# Patient Record
Sex: MALE | Race: WHITE | Employment: FULL TIME | URBAN - METROPOLITAN AREA
[De-identification: names, ages, dates, MRNs, and addresses within clinical notes are randomized per-mention and may not be internally consistent; named-entity substitution may affect disease eponyms.]

---

## 2018-12-11 ENCOUNTER — HOSPITAL ENCOUNTER (OUTPATIENT)
Dept: BEHAVIORAL HEALTH | Facility: CLINIC | Age: 53
Discharge: HOME | End: 2018-12-11
Attending: SOCIAL WORKER | Admitting: SOCIAL WORKER
Payer: COMMERCIAL

## 2018-12-11 VITALS
SYSTOLIC BLOOD PRESSURE: 133 MMHG | WEIGHT: 172 LBS | DIASTOLIC BLOOD PRESSURE: 97 MMHG | BODY MASS INDEX: 26.07 KG/M2 | HEART RATE: 68 BPM | HEIGHT: 68 IN

## 2018-12-11 PROCEDURE — H0001 ALCOHOL AND/OR DRUG ASSESS: HCPCS

## 2018-12-11 RX ORDER — TIZANIDINE HYDROCHLORIDE 2 MG/1
2 CAPSULE, GELATIN COATED ORAL 3 TIMES DAILY
COMMUNITY

## 2018-12-11 RX ORDER — DICYCLOMINE HYDROCHLORIDE 10 MG/1
10 CAPSULE ORAL
COMMUNITY

## 2018-12-11 RX ORDER — AMITRIPTYLINE HYDROCHLORIDE 10 MG/1
10 TABLET ORAL AT BEDTIME
COMMUNITY

## 2018-12-11 ASSESSMENT — ANXIETY QUESTIONNAIRES
5. BEING SO RESTLESS THAT IT IS HARD TO SIT STILL: NOT AT ALL
6. BECOMING EASILY ANNOYED OR IRRITABLE: NOT AT ALL
4. TROUBLE RELAXING: SEVERAL DAYS
3. WORRYING TOO MUCH ABOUT DIFFERENT THINGS: SEVERAL DAYS
7. FEELING AFRAID AS IF SOMETHING AWFUL MIGHT HAPPEN: NOT AT ALL
GAD7 TOTAL SCORE: 3
1. FEELING NERVOUS, ANXIOUS, OR ON EDGE: SEVERAL DAYS
2. NOT BEING ABLE TO STOP OR CONTROL WORRYING: NOT AT ALL

## 2018-12-11 ASSESSMENT — MIFFLIN-ST. JEOR: SCORE: 1599.69

## 2018-12-11 ASSESSMENT — PAIN SCALES - GENERAL: PAINLEVEL: MILD PAIN (2)

## 2018-12-11 ASSESSMENT — PATIENT HEALTH QUESTIONNAIRE - PHQ9: SUM OF ALL RESPONSES TO PHQ QUESTIONS 1-9: 4

## 2018-12-11 NOTE — PROGRESS NOTES
"[facility]  [address] [place] [id number]      ADULT CD ASSESSMENT ADDENDUM      Patient Name: Iker Stephenson  Cell Phone: [phone]  Home:  [phone]    Email: [email or web address]  Emergency Contact: Yaneth Stephenson (wife) Tel: [phone]    ________________________________________________________________________      The patient reported being:      With which race do you identify? White    Initial Screening Questions     1. Are you currently having severe withdrawal symptoms that are putting yourself or others in danger?  No    2. Are you currently having severe medical problems that require immediate attention?  No    3. Are you currently having severe emotional or behavioral problems that are putting yourself or others at risk of harm?  No    4. Do you have sufficient reading skills that will enable you to understand written materials, including the program rules and client rights materials?  Yes    Family History and other additional information     Who raised you? (parents, grandparents, adoptive parents, step-parents, etc.)    Both Parents    Please tell me what it was like growing up in your family. (please include any history of substance abuse, mental health issues, emotional/physical/sexual abuse, forms of discipline, and support)     \"I have 2 brothers and a sister. I am the middle, second oldest. I had a good childhood, upbringing. My father worked for the [employer] for 40 years. My parents were  for 42 years. I played baseball football, baseball, and hockey. 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 \"My mother has passed. My father is still alive and doing well. I get along with my brother and sister. My younger brother knows I am here, that is how close we are.\"    Do you have any children or Stepchildren? Yes, please explain: 11 year old son    Are you being investigated by Child " "Protection Services? No    Do you have a child protection worker, probation office or ? No    How would you describe your current finances?  Doing okay    If you are having problems, (unpaid bills, bankruptcy, IRS problems) please explain:  No    If working or a student are you able to function appropriately in that setting? Yes    Describe your preferred learning style:  by hands-on practice    What are your some of your personal strengths?  \"I am personable.\"    Do you currently participate in community juan m activities, such as attending Sikh, temple, Amish or Religious services?  No    How does your spirituality impact your recovery?  \"it could help. I am a confirmed Yazdanism, and my wife is Gnosticist. I could be going to Mass once in awhile to help my brain. It couldn't hurt.\"    Do you currently self-administer your medications?  Yes    Have you ever had to lie to people important to you about how much you raines?     No   Have you ever felt the need to bet more and more money?     No   Have you ever attempted treatment for a gambling problem?     No   Have you ever touched or fondled someone else inappropriately or forced them to have sex with you against their will?     No   Are you or have you ever been a registered sex offender?     No   Is there any history of sexual abuse in your family?     No   Have you ever felt obsessed by your sexual behavior, such as having sex with many partners, masturbating often, using pornography often?     No     Have you ever received therapy or stayed in the hospital for mental health problems?     No     Have you ever hurt yourself, such as cutting, burning or hitting yourself?     No     Have you ever purged, binged or restricted yourself as a way to control your weight?     No     Are you on a special diet?     No     Do you have any concerns regarding your nutritional status?     No     Have you had any appetite changes in the last 3 months?     No "   Have you had weight loss or weight gain of more than 10 lbs in the last 3 months?   If patient gained or lost more than 10 lbs, then refer to program RN / attending Physician for assessment.     Yes, explain: lost 6 lbs in the past week.   Was the patient informed of BMI?    Above,  General nutrition education     Yes   Have you engaged in any risk-taking behavior that would put you at risk for exposure to blood-borne or sexually transmitted diseases?     No   Do you have any dental problems?     No   Have you ever lived through any trauma or stressful life events?     Yes- his mother's death at age 63.   In the past month, have you had any of the following symptoms related to the trauma listed above? (dreams, intense memories, flashbacks, physical reactions, etc.)     No   Have you ever believed people were spying on you, or that someone was plotting against you or trying to hurt you?     No   Have you ever believed someone was reading your mind or could hear your thoughts or that you could actually read someone's mind or hear what another person was thinking?     No   Have you ever believed that someone of some force outside of yourself was putting thoughts into your mind or made you act in a way that was not your usual self?  Have you ever though you were possessed?     No   Have you ever believed you were being sent special messages through the TV, radio or newspaper?     No   Have you ever heard things other people couldn't hear, such as voices or other noises?     No   Have you ever had visions when you were awake?  Or have you ever seen things other people couldn't see?     No   Do you have a valid 's license?       Yes     PHQ-9, SERJIO-7 and Suicide Risk Assessment   PHQ-9 on 12/11/2018 SERJIO-7 on 12/11/2018   The patient's PHQ-9 score was 4 out of 27, indicating minimal depression.     The patient's SERJIO-7 score was 3 out of 21, indicating minimal anxiety.         [place]-Suicide Severity Rating Scale    Suicide Ideation   1.) Have you ever wished you were dead or that you could go to sleep and not wake up?     Lifetime:  No Past Month:  No   2.) Have you actually had any thoughts of killing yourself?   Lifetime:  No Past Month:  No   3.) Have you been thinking about how you might do this?     Lifetime:  NA Past Month:  NA   4.) Have you had these thoughts and had some intention of acting on them?     Lifetime:  NA Past Month:  NA   5.) Have you started to work out the details of how to kill yourself?   Lifetime:  NA Past Month:  NA   6.) Do you intend to carry out this plan?      Lifetime:  NA Past Month:  NA   Intensity of Ideation   Intensity of ideation (1 being least severe, 5 being most severe):     Lifetime:  NA Past Month:  NA   How often do you have these thoughts?  N/A      When you have the thoughts how long do they last?  N/A   Can you stop thinking about killing yourself or wanting to die if you want to?  No, unable to control thoughts   Are there things - anyone or anything (i.e. family, Temple, pain of death) that stopped you from wanting to die or acting on thoughts of suicide?  Does not apply   What sort of reasons did you have for thinking about wanting to die or killing yourself (ie end pain, stop how you were feeling, get attention or reaction, revenge)?  Does not apply   Suicidal Behavior   (Suicide Attempt) - Have you made a suicide attempt?     Lifetime:  No Past Month:  No   Have you engaged in self-harm (non-suicidal self-injury)?  NA   (Interrupted Attempt) - Has there been a time when you started to do something to end your life but someone or something stopped you before you actually did anything?  NA   (Aborted or Self-Interrupted Attempt) - Has there been a time when you started to do something to try to end your life but you stopped yourself before you actually did anything?  NA   (Preparatory Acts of Behavior) - Have you taken any steps towards making suicide attempt or preparing  "to kill yourself (such as collecting pills, getting a gun, giving valuables away or writing a suicide note)?  NA   Actual Lethality/Medical Damage:  NA     2008  [employer] [facility]  Used with permission by Oneida Madrid, PhD.       Guide to C-SSRS Risk Ratings   NO IDEATION:  with no active thoughts IDEATION: with a wish to die. IDEATION: with active thoughts. Risk Ratings   If Yes No No 0 - Very Low Risk   If NA Yes No 1 - Low Risk   If NA Yes Yes 2 - Low/moderate risk   IDEATION: associated thoughts of methods without intent or plan INTENT: Intent to follow through on suicide PLAN: Plan to follow through on suicide Risk Ratings cont...   If Yes No No 3 - Moderate Risk   If Yes Yes No 4 - High Risk   If Yes Yes Yes 5 - High Risk   The patient's ADDITIONAL RISK FACTORS and lack of PROTECTIVE FACTORS may increase their overall suicide risk ratings.     Additional Risk Factors:    Significant history of physical illness or chronic medical problems     Significant history of untreated or poorly treated chronic pain issues     History of impulsive or aggressive behaviors   Protective Factors:    Having people in his/her life that would prevent the patient from considering a suicide attempt (i.e. young children, spouse, parents, etc.)     An absence of mental health issues or stable and well treated mental health issues     A positive relationship with his/her clinical medical and/or mental health providers     Having easy access to supportive family members     Having a good community support network     Having cultural, Druze or spiritual beliefs that discourage suicide     Having restricted access to highly lethal means of suicide     Risk Status   Past month:0. - Very Low Risk:  Evaluation Counselors:  Document in Epic / SBAR to counselor \"Very Low Risk\".      Treatment Counselors:  Reassess upon admission as applicable, assess weekly in progress notes under Dimension 3 and summarize " "in Discharge / Treatment summary under Dimension 3.    Past 24 hours:0. - Very Low Risk:  Evaluation Counselors:  Document in Epic / SBAR to counselor \"Very Low Risk\".      Treatment Counselors:  Reassess upon admission as applicable, assess weekly in progress notes under Dimension 3 and summarize in Discharge / Treatment summary under Dimension 3.   Additional information to support suicide risk rating: There was no additional information to provide at this time.  Please see the above suicide risk rating information.     Mental Health Status   Physical Appearance/Attire: Appears stated age   Hygiene: well groomed   Eye Contact: at examiner   Speech Rate:  regular   Speech Volume: regular   Speech Quality: fluid   Cognitive/Perceptual:  reality based   Cognition: memory intact    Judgment: intact   Insight: intact   Orientation:  time, place, person and situation   Thought::   logical    Hallucinations:  none   General Behavioral Tone: cooperative   Psychomotor Activity: no problem noted   Gait:  no problem   Mood: normal and appropriate   Affect: congruence/appropriate   Counselor Notes: NA       Criteria for Diagnosis: DSM-5 Criteria for Substance Use Disorders      Alcohol Use Disorder Severe - 303.90 (F10.20)  Opioid Use Disorder Mild - 305.50 (F11.10)      Level of Care   I.) Intoxication and Withdrawal: 0   II.) Biomedical:  1   III.) Emotional and Behavioral:  1   IV.) Readiness to Change:  1   V.) Relapse Potential: 2   VI.) Recovery Environmental: 2       Initial Problem List     The patient lacks relapse prevention skills  The patient has poor coping skills  The patient has poor refusal skills   The patient lacks a sober peer support network  The patient lacks the ability to effectively manage his/her mental health issues    Patient/Client is willing to follow treatment recommendations.  Yes    Coun[facility]    Vulnerable Adult Checklist for OUTPATIENTS     1.  Do you have a " physical, emotional or mental infirmity or dysfunction?       Yes (explain) - 4 fusions in his neck in 2016, chronic pain as a result    2.  Does this issue impair your ability to provide for your own care without help, including providing yourself with food, shelter, clothing, healthcare or supervision?       No    3.  Because of this issue, I need assistance to protect myself from maltreatment by others.      No    Based on the above information:    This person is not a functional Vulnerable Adult according to [place] Statute 626.5572 subdivision 21.

## 2018-12-11 NOTE — PROGRESS NOTES
"Visit Date:   12/11/2018      CHEMICAL DEPENDENCY ASSESSMENT       EVALUATION COUNSELOR:  Pilar Moreno MA, [facility]      PATIENT ADDRESS: [address] [place] [address]   TELEPHONE: [phone].   STATISTICS:  Date of Birth 1965, age 53, gender male.  Marital Status:  .   DATE OF EVALUATION:  12/11/2018   INSURANCE:  [employer] [facility]      REASON FOR EVALUATION:  Mr. Iker Stephenson presents to [facility] for outpatient evaluation of possible chemical dependency.  The reason for the CD evaluation was due to patient, \"I am not making the right decisions every day.  I am not.  I am making poor decisions.  The drinking and the pain medications I was on for a long, long time.  It is easy to get them from friends.\"  The patient is at risk of losing his job if he does not address his addiction.  He is willing to attend an [facility] CD treatment program.      HEALTH HISTORY AND MEDICATIONS:  He had for four neck fusions in late 2016.  He has chronic neck and back pain, high blood pressure and diverticula in his colon.      MEDICATIONS:  Amitriptyline, Bentyl, lisinopril, tizanidine.      HISTORY OF PREVIOUS TREATMENT AND COUNSELING:  The patient reports 1 CD treatment 20-25 years ago.      HISTORY OF ALCOHOL AND DRUG USE:    Alcohol: Age of first use 18, heaviest use was between the ages of [age] when he was drinking 7-8 beers every couple of days.  Prior to 2017, he was drinking once every couple of weeks, having about 5 beers.  He reports he goes 7-10 days without drinking.  [id number], in the past year and a half, he is mostly drinking once a week on Saturday having 7 Budweiser beers.  He will drink 6-7 beers on a Sunday once a month.  Collateral reports excessive drinking on the weekends and occasional drinking during the work week.  Last use about 10 days ago, about 5 beers.    Cocaine: Age of first use 25. He will use every couple of months, 1 line each time.  He " "denies heavy use.  His UA was positive for cocaine.  The patient denies using more recently than 12/05/2018.  Last use of cocaine was 12/5/2018, 1 line.    Meth:  He reports he tried meth for the first time 10 days ago.    Other opiate and synthetics:  Age of first use 2009.  He was first prescribed 9 years ago and was prescribed oxycodone and Percocet for 6 months after his neck surgery.  At the end of 2016, he had 4 fusions in his neck and there was 120 days of recovery.  He was prescribed \"Percocet and Oxy for 6 months, which carried into 2018.\"  He denies any abuse of the pain medication.  Two months ago, he was hospitalized for diverticulitis and upon discharge was prescribed 20 tabs of 10 mg of Percocet.  This prescription lasted 3 days.  For the past 6 months, patient reports buying 10 tabs of oxycodone off the street every couple of weeks.  He reports the tabs will last him a week and because he will take 1 tab a day usually in late afternoon/evening.  Last use was 12/05/2018, 5 mg of oxycodone.      SUMMARY OF CHEMICAL DEPENDENCY SYMPTOMS ACKNOWLEDGED BY THE PATIENT:  The patient identifies with 8/11 DSM-V criteria for a diagnostic impression of substance use disorder.      SUMMARY OF COLLATERAL DATA:  The patient's wife, Yaneth Stephenson, stated patient does not drink every day, but when he does, he drinks an excessive amount of alcohol.  This typically occurs over the weekend and occasionally patient will drink during the week.  She reports he can go without drinking for a period of time before he binges.  He will sometimes drink beer.  She stated she does not know much about his oxycodone use and she mostly sees the changes in him due to his drinking.  She reports the patient's work is stressful and his health issues are concerning.  As a result, the patient gets depressed and he will drink.  She has been concerned about his drinking for the past couple of years.  He reported the patient has been kind of " depressed lately.  In 2004, his mother passed away and they were very close.  She is not sure the patient has ever gotten over it.  She reports the patient does not see his dad a lot and patient becomes depressed because of that.  She stated the patient who is a good dad and loves her son.  He is going to drop what he is doing to help another person.      VULNERABLE ADULT ASSESSMENT:  This person is not a functional vulnerable adult court according to [place] Statute 636.557, subdivision 21.      IMPRESSION:     1.  Alcohol use disorder, severe (F10.20).   2.  Opiate use disorder, mild (F11.1).      [facility] PLACEMENT CRITERIA:   DIMENSION 1:  Acute Intoxication or Withdrawal Potential:  Risk level 0.  The patient reports his last use of oxycodone and cocaine was on 12/05/2018 and he last used meth and alcohol about 10 days ago.  The patient displays no intoxication or withdrawal symptomatology at this time.  The patient denies having any feelings of withdrawal.  The patient was given a Breathalyzer during his evaluation is blood alcohol content was 0.  He was also given a UA during the evaluation.  UA was positive for cocaine and substances tested.  The patient denied using cocaine more recently than 12/05/2018.       DIMENSION 2:  Biomedical Conditions and Complications:  Risk level 1.  The patient denies having any chronic biomedical conditions that interfere with treatment or any other recovery skills training or workshop.  The patient reports having 4 neck fusions in late 2016.  He has chronic neck and back pain, high blood pressure, and diverticulitis in his colon.  He reports taking lisinopril daily.  At the time of CD evaluation, the patient's blood pressure was 133/97, pulse 68 beats per minute.  The patient's BMI score was 26.15, placing him in the overweight category.  He reports having pain at this time and reports his pain level to be a 2 on 0-10 pain rating scale.  The patient denies any consumption of  "nicotine products at this time.      DIMENSION 3:  Emotional/Behavioral Conditions and Complications:  Risk level 1.  The patient denies having any mental health diagnosis and denies taking any medications for his mental health.  The patient denies any history of abuse.  At the time of the assessment, the patient's PHQ-9 score was 4, minimal depression.  SERJIO-7 score was 3, minimal anxiety.  The patient appears to have some emotional stress management skills.  The patient denies any self-injury, previous suicide attempts or suicidal thoughts at this time.  During the patient's assessment, his [place] Suicide Severity rating Scale Score is 0, very low risk.        DIMENSION 4:  Readiness for Change:  Risk level 1.  Patient admits he has had a problem with alcohol since about 2000.  He has recognized that he has had a problem with oxycodone since he began buying it off the street about 6 months ago.  The patient displays verbal compliance and motivation to stop using alcohol and attend [facility] CD treatment.  The patient can only return to work once he has been enrolled in a CD treatment program.  The patient appears to be in the contemplation stage within a stage of change model.      DIMENSION 5:  Relapse and Continued Use Potential:  Risk level 2.  The patient reports he attended an [facility] CD treatment program 20-25 years ago for his drinking.  He denies ever attending a detox program or a 12-step meeting before.  Patient lacks education into his disease of addiction.  The patient identifies relapse triggers for alcohol as \"I just like the way I feel.  The drinking is a trigger for everything else.\"  His triggers for oxycodone is \"pain\" and for cocaine \"alcohol.  If I don't drink, I don't think about.\"  The patient lacks impulse control, long-term sober maintenance skills.  He has a moderately high risk for continued use.      DIMENSION 6:  Recovery Environment:  Risk level 2.  The patient currently lives with his wife and " son.  He denied having any concerns regarding his immediate living environment or neighborhood.  The patient reported having relationship problems with his wife and his employer due to his alcohol abuse issues.  The patient denied having history of legal issues.  The patient is employed and he last worked 12/5/2018 due to being in [place] because of his addiction.  The patient denied having any increased financial stress.  He lacks a current sober peer support network.      RECOMMENDATIONS:   1.  Complete intensive outpatient CD treatment program such as [facility] with Ramandeep Chandler.   2.  Abstain from all mood-altering chemicals unless prescribed by licensed provider.   3.  Attend at minimum 1 weekly support group meeting such as 12-step based, SMART Recovery, Celebrate Recovery or Refuge Recovery meetings.   4.  Actively work with a male sponsor.   6.  Follow recommendations of treatment and medical providers.      INITIAL PROBLEM LIST:   1.  The patient lacks relapse prevention skills.   2.  The patient has poor coping skills.   3.  The patient has poor refusal skills.   4.  The patient lacks sober peer support network.   5.  The patient lacks ability to effectively manage his mental health.         This information has been disclosed to you from records protected by Federal confidentiality rules (42 CFR part 2). The Federal rules prohibit you from making any further disclosure of this information unless further disclosure is expressly permitted by the written consent of the person to whom it pertains or as otherwise permitted by 42 CFR part 2. A general authorization for the release of medical or other information is NOT sufficient for this purpose. The Federal rules restrict any use of the information to criminally investigate or prosecute any alcohol or drug abuse patient.      GREYSON JEFF CD             D: 12/11/2018   T: 12/11/2018   MT: MAKR      Name:     FRANCISCO JAVIERAIMECALLUM   MRN:       [id number]        Account:      [id number]   :      1965           Visit Date:   2018      Document: [id number]

## 2018-12-11 NOTE — PROGRESS NOTES
[facility]  [address] [place] [address]., [place] [address]      12/11/2018      Iker Stephenson  [address] [place] [address]      Dear Mr. Stephenson,    It was a pleasure meeting with you on 12/11/2018 for your Chemical Dependency Evaluation. Based on your evaluation, the recommendation is:  1)  Complete an Intensive Outpatient CD Treatment Program, such as [facility] with Ramandeep Chandler.  2)  Abstain from all mood-altering chemicals unless prescribed by a licensed provider.   3)  Attend one weekly support group meeting, such as 12 step based (AA/NA), SMART Recovery, Health Realizations, Celebrate Recovery meetings, and/or Refuge Recovery meetings.     4)  Actively work with a male mentor/sponsor on a weekly basis.   5)  Follow all the recommendations of your treatment/medical providers.    Please contact Ramandeep Chandler at [phone] to set up a start time. Group is held every Monday, Wednesday, and Thursday from 5:30-7:30pm and on Fridays 5:30-8:30pm.    If I can be of further service, please don't hesitate to call.    Sincerely,        Pilar Moreno MA [facility]  CD Evaluation Counselor  [phone]    (emailed to pt on 12/11/2018)

## 2018-12-11 NOTE — PROGRESS NOTES
[facility]  [address] [place] [id number]      12/11/2018      RE: Iker Stephenson    Dear MrHannah Meagan Thompson,    I met with Iker Seema on 12/11/2018 for his Chemical Dependency Evaluation. Based on the evaluation, the recommendation is:  1)  Complete an Intensive Outpatient CD Treatment Program, such as [place] at the [facility] with Ramandeep Chandler.  2)  Abstain from all mood-altering chemicals unless prescribed by a licensed provider.   3)  Attend one weekly support group meeting, such as 12 step based (AA/NA), SMART Recovery, Health Realizations, Celebrate Recovery meetings, and/or Refuge Recovery meetings.     4)  Actively work with a male mentor/sponsor on a weekly basis.   5)  Follow all the recommendations of your treatment/medical providers.    Iker was asked to contact Ramandeep Chandler at [phone] to set up an intake start date. Group is held every Monday, Wednesday, and Thursday from 5:30-7:30pm and on Fridays 5:30-8:30pm.    If I can be of further service, please don't hesitate to call.    Sincerely,        Pilar Moreno MA [facility]  CD Evaluation Counselor  [phone]    (emailed to Meagan Thompson on 12/11/2018)

## 2018-12-11 NOTE — PROGRESS NOTES
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 more than you planned? (DSM)     He reports he is not drinking more beers than he thought he would drink.    3. How important is using to your social connections? Do many of your family or friends use?     Friends: they drink  Wife: sometimes, she's not a big drinker.    4A. Are you currently in a significant relationship?     Yes.  4B. How long? Almost 20 years     4C. Sexual Orientation:     Heterosexual    5A. Who do you live with?      His wife and son.    5B. Tell me about their alcohol/drug use and mental health issues.     NA    5C. Are you concerned for your safety there? No    5D. Are you concerned about the safety of anyone else who lives with you? No    6A. Do you have children who live with you?     Yes- 11 year old son.    6B. Do you have children who do not live with you?     No    7A. Who supports you in making changes in your alcohol or drug use? What are they willing to do to support you? Who is upset or angry about you making changes in your alcohol or drug use? How big a problem is this for you?      His wife, \"my brother. I talked with him about it yesterday.\"    7B. This table is provided to record information about the person s relationships and available support It is not necessary to " ask each item; only to get a comprehensive picture of their support system.  How often can you count on the following people when you need someone?   Partner / Spouse Always supportive   Parent(s)/Aunt(s)/Uncle(s)/Grandparents Always supportive   Sibling(s)/Cousin(s) Always supportive   Child(tabitha) Always supportive   Other relative(s) Always supportive   Friend(s)/neighbor(s) Always supportive   Child(tabitha) s father(s)/mother(s) N/A   Support group member(s) N/A   Community of juan m members N/A   /counselor/therapist/healer Always supportive   Other (specify) N/A     8A. What is your current living situation?     He owns his own home.    8B. What is your long term plan for where you will be living?     No changes.    8C. Tell me about your living environment/neighborhood? Ask enough follow up questions to determine safety, criminal activity, availability of alcohol and drugs, supportive or antagonistic to the person making changes.      No concerns.    9. Criminal justice history: Gather current/recent history and any significant history related to substance use--Arrests? Convictions? Circumstances? Alcohol or drug involvement? Sentences? Still on probation or parole? Expectations of the court? Current court order? Any sex offenses - lifetime? What level? (DSM)    None    10. What obstacles exist to participating in treatment? (Time off work, childcare, funding, transportation, pending long-term time, living situation)     His work schedule.    Dimension VI Ratings   Recovery environment - The placing authority must use the criteria in Dimension VI to determine a client s recovery environment.   RISK DESCRIPTIONS - Severity ratin Client is engaged in structured, meaningful activity, but peers, family, significant other, and living environment are unsupportive, or there is criminal justice involvement by the client or among the client s peers, significant others, or in the client s living  environment.    REASONS SEVERITY WAS ASSIGNED - (What support does the person have for making changes? What structure/stability does the person have in his or her daily life that will increase the likelihood that changes can be sustained? What problems exist in the person s environment that will jeopardize getting/staying clean and sober?)     The patient currently lives with his wife and son. The patient denied having any concerns regarding his immediate living environment or neighborhood. The patient reported having a relationship conflict with his wife and his employer due to his alcohol abuse issues. The patient denied having a history of legal issues. The patient is employed and he last worked 12/5/2018 due to being on a KAMERON because of his addiction.  The patient denied having any increased financial stress.  The patient lacks a current sober peer support network.         Client Choice/Exceptions   Would you like services specific to language, age, gender, culture, Taoism preference, race, ethnicity, sexual orientation or disability?  No    What particular treatment choices and options would you like to have? IOP    Do you have a preference for a particular treatment program? [facility]    Criteria for Diagnosis     Criteria for Diagnosis  DSM-5 Criteria for Substance Use Disorder  Instructions: Determine whether the client currently meets the criteria for Substance Use Disorder using the diagnostic criteria in the DSM-V pp.[id number]. Current means during the most recent 12 months outside a facility that controls access to substances    Category of Substance Severity (ICD-10 Code / DSM 5 Code)     Alcohol Use Disorder Severe  (10.20) (303.90)   Cannabis Use Disorder NA   Hallucinogen Use Disorder NA   Inhalant Use Disorder NA   Opioid Use Disorder Mild   (F11.10) (305.50)   Sedative, Hypnotic, or Anxiolytic Use Disorder NA   Stimulant Related Disorder NA   Tobacco Use Disorder NA   Other (or unknown) Substance  Use Disorder NA       Collateral Contact Summary   Number of contacts made: 1    Contact with referring person:  NA.    If court related records were reviewed, summarize here: NA    Information from collateral contacts supported/largely agreed with information from the client and associated risk ratings.      Rule 25 Assessment Summary and Plan   's Recommendation    1)  Complete an Intensive Outpatient CD Treatment Program, such as [facility] at the [place] location with Ramandeep Chandler.  2)  Abstain from all mood-altering chemicals unless prescribed by a licensed provider.   3)  Attend one weekly support group meeting, such as 12 step based (AA/NA), SMART Recovery, Health Realizations, Celebrate Recovery meetings, and/or Refuge Recovery meetings.     4)  Actively work with a male mentor/sponsor on a weekly basis.   5)  Follow all the recommendations of your treatment/medical providers.      Collateral Contacts     Name:    Yaneth Stephenson   Relationship:    wife   Phone Number:    [phone] Releases:    Yes     Yaneth stated pt doesn't drink every day, but when he does, he drinks an excessive amount of alcohol. This typically occurs over the weekend, occassionally pt will drink during the week. She reports he can go without drinking for a period of time before he binges. He will sometimes drink beer. She stated she didn't know much about his oxycodone use since she mostly sees the changes in him due to his drinking. She reports pt's work is stressful and his health issues are concerning and as a result, pt gets depressed and he will drink. She has been concerned about his drinking for the past couple of years. She reported pt has been kind of depressed lately. In 2004 his mother passed away and they were very close. She isn't sure pt has ever gotten over it. She reports pt doesn't see his dad a lot and pt becomes depressed because of it. She stated pt is a good dad and loves their son. He is willing to  drop what he's doing to help another person.  ollateral Contacts      A problematic pattern of alcohol/drug use leading to clinically significant impairment or distress, as manifested by at least two of the following, occurring within a 12-month period: 8/11    There is a persistent desire or unsuccessful efforts to cut down or control alcohol/drug use  A great deal of time is spent in activities necessary to obtain alcohol, use alcohol, or recover from its effects.  Recurrent alcohol/drug use resulting in a failure to fulfill major role obligations at work, school or home.  Continued alcohol use despite having persistent or recurrent social or interpersonal problems caused or exacerbated by the effects of alcohol/drug.  Important social, occupational, or recreational activities are given up or reduced because of alcohol/drug use.  Recurrent alcohol/drug use in situations in which it is physically hazardous.  Alcohol/drug use is continued despite knowledge of having a persistent or recurrent physical or psychological problem that is likely to have been caused or exacerbated by alcohol.  Withdrawal, as manifested by either of the following: The characteristic withdrawal syndrome for alcohol/drug (refer to Criteria A and B of the criteria set for alcohol/drug withdrawal).      Specify if: In early remission:  After full criteria for alcohol/drug use disorder were previously met, none of the criteria for alcohol/drug use disorder have been met for at least 3 months but for less than 12 months (with the exception that Criterion A4,  Craving or a strong desire or urge to use alcohol/drug  may be met).     In sustained remission:   After full criteria for alcohol use disorder were previously met, non of the criteria for alcohol/drug use disorder have been met at any time during a period of 12 months or longer (with the exception that Criterion A4,  Craving or strong desire or urge to use alcohol/drug  may be met).    Specify if:   This additional specifier is used if the individual is in an environment where access to alcohol is restricted.    Mild: Presence of 2-3 symptoms    Moderate: Presence of 4-5 symptoms    Severe: Presence of 6 or more symptoms

## 2018-12-12 ASSESSMENT — ANXIETY QUESTIONNAIRES: GAD7 TOTAL SCORE: 3

## 2018-12-18 ENCOUNTER — HOSPITAL ENCOUNTER (OUTPATIENT)
Dept: BEHAVIORAL HEALTH | Facility: CLINIC | Age: 53
End: 2018-12-18
Attending: SOCIAL WORKER
Payer: COMMERCIAL

## 2018-12-18 PROCEDURE — H2035 A/D TX PROGRAM, PER HOUR: HCPCS

## 2018-12-18 NOTE — PROGRESS NOTES
Patient:  Iker Stephenson    Date: December 18, 2018    Comprehensive Assessment UPDATE        Comprehensive Summary Update and Review  Counselor met with patient on 12/18/2018 and reviewed the Comprehensive Assessment.    The following updates have been made: Last use date changed from 12/1/2018 to 11/26/18.

## 2018-12-18 NOTE — PROGRESS NOTES
Initial Services Plan        Service Initiation Date: 12/18/2018    Immediate health and/or safety concerns: No    Identify health and safety concern(s) below and include plan to address:    None Identified    Client issues to be addressed in the first treatment sessions:     Other: I fear I may fear.      Treatment suggestions for client during the time between intake (admit date) and completion of the individual treatment plan:     Look for a sober support network, i.e. 12 step, Smart Recovery, Celebrate Recovery, etc  Introduce yourself to your treatment group. Spend time getting to know your peers  Review your patient or client handbook    Completed by: MANGO Enriquez  Date completed: 12/18/2018 at 3:12 PM

## 2018-12-19 ENCOUNTER — HOSPITAL ENCOUNTER (OUTPATIENT)
Dept: BEHAVIORAL HEALTH | Facility: CLINIC | Age: 53
End: 2018-12-19
Attending: SOCIAL WORKER
Payer: COMMERCIAL

## 2018-12-19 PROCEDURE — H2035 A/D TX PROGRAM, PER HOUR: HCPCS | Mod: HQ

## 2018-12-19 NOTE — PROGRESS NOTES
"  Key to reading dimensions:   -Bolded text in a dimension indicates information about a client at service initiation.  -Underlined portions in a dimension indicate important information that writer is carrying forward from week to week as a reminder.    CD ADULT Progress Note     Treatment Plan Review completed on:  12/21/2018     Attendance Dates: Phase 1 group sessions on 12/19, 12/20, 12/21//2018     Total # of Group Sessions for Phase I: 4.  Total # of Group Sessions in Phase II:  N/A  Total # of Group Sessions in Phase III:  N/A  Total # of 1:1 Sessions: 2.       MONDAY TUESDAY WEDNESDAY THURSDAY FRIDAY SATURDAY SUNDAY TOTAL HOURS   Group Therapy   2 2 3   7   Specialty Groups*            1:1  1.0  1    2.0   Family Program           [place]             Phase III             Absent (place \"X\")             Total's  1 2.0 3 3   9.0     *Specialty Groups include Mental Health Care, Assertiveness and Communication, Sobriety Maintenance Skills, Spiritual Care, Stress Management, Relapse Prevention, Family Systems.                             Learning Style:    Hands on    Staff member contributing: Ramandeep Chandler MA, [facility]     Received supervision: No.    Client contributed to goals and plan: Yes (explain) - New Plan.    Client received a signed copy of treatment plan/revised plan: Yes    Changes to Treatment Plan: No.    Client agrees with plan/revised plan: Yes    Any changes in Vulnerable Adult Status:  No    1) Care Coordination Activities: No.  2) Medical, Mental Health and other appointments the client attended: SEE DIM's II & III below.  3) Medication issues: No.  4) Physical and mental health problems: SEE DIM's II & III below.  5) Review and evaluation of the individual abuse prevention plan: N/A     Substance Use Disorders:    303.90 (F10.20) Alcohol Use Disorder Severe  305.20 (F12.10) Cannabis Use Disorder Mild  305.60 (F14.10) Cocaine Use Disorder Mild    ASAM Risk Ratings and Data     DIMENSION 1: Acute " "Intoxication/Withdrawal  The client's ability to cope with withdrawal symptoms and current state of intoxication     Acute Intoxication/Withdrawal - Current Risk Factor:  1    Reporting a sober date of: 10/16/2018 for Alcohol.    Goals:  Develop effective strategies to maintain sobriety. Report any substance or alcohol use to both counselor and the group.     Data: Client denied nor demonstrated any signs/symptomology of intoxication and/or withdrawal. Client denied having any symptoms of PAW. Client confirmed his last use date was on 12/5/2018 for cocaine and oxy.       DIMENSION 2:  Biomedical Conditions and Complaints  The degree to which any physical disorder would interfere with treatment for substance abuse and the client's ability to tolerate any related discomfort     Biomedical Conditions and Complaints - Current Risk Factor:  1    Goals: Disclose CD status to medical providers and follow up with medical interventions while in DOP.     Goal: Client will effectively manage his physical health and will follow recommendations of your medical provider.    Data: Client sees Dr. Salomon Green and NP Tammy Cheatham, [facility] diverticulitits, high blood pressure, chronic neck and back pain.      Client rated his overall health on a scale of 1-10 (1=poor, 10=excellent): 7. Client reports doing the following for self-care during the past week; \"completing work projects.\" Client reports the following changes to his physical health over the past week; \"I feel strong\". Client reports the following upcoming doctor s appointments: Colonoscopy, January 10, 2018\".     DIMENSION 3:  Emotional/Behavioral/Cognitive Conditions and Complications  The degree to which any condition or complications are likely to interfere with treatment for substance abuse or with function in significant life areas and the likelihood of risk of harm to self or others.     Emotional/Behavioral - Current Risk Factor:  1    DSM-5 " "Diagnoses:   Client denies mental health diagnosis  Suicide Assessment:   Risk Status    Ideation - Active thoughts of suicide Intent to follow through on suicide Plan for completing suicide    Yes No Yes No Yes No   Emergent  x  x  x   Urgent / Non-Emergent  x  x  x   Non- Urgent  x  x  x   No Current/Active Risk  x  x  x      Goals: To develop affective tools to manage his emotions.  Goal:  Learn and utilize coping skills to manage his irrational believes and struggles with impulsivity in a healthy way.  Goals:  Increase awareness and understanding of the relationship between mental health and substance use.     Data: Client reports feeling the following two emotions today; \"Anxious and happy.\" Client rated the following MH symptoms on a scale of 1-10 (0=did not endorse): sleeplessness- 1, fatigue- 2, difficulty concentrating- 2, mood swings- 2, irritability- 4, sadness- 3, excessive sleep- 1, racing thoughts- 1, hopelessness- 1. Client denies experiencing any other aforementioned symptomology over the past week. Client reports taking the following psychotropic medications; Lisinopril. Client identified his 2 stressors of Work and Fatherhood.  He is addressing his 2 stressors by \"still learning how to cope.\"  Client denies having a mental health therapist. Client denied suicidal ideation or self-injurious behavior.    DIMENSION 4:  Readiness to Change  Consider the amount of support and encouragement necessary to keep the client involved in treatment.     Readiness to Change - Current Risk Factor:  1    Goals:  Increase awareness with how substance use is conflicted with personal values and address any ambivalence to change.   Goals:  Address ambivalence regarding substance use and sobriety.  Goals:  Increase insight into how use has affected you and those around you  Goals:  Increase and/or maintain internal motivation to achieve sobriety from substances.    Data: Client rated their motivation of the week on a " "scale of 1-10 (1=low, 10= high): 2. Client reports his main motivation for sobriety is \"family\". Client reports \"Things with no structure- free time\" is what blocks motivation for sobriety for him. Client reports using the following coping strategies that are helpful for his continued sobriety: \"Talking to my wife and son.\" Client reports wanting to learn the following coping skills and/or to practice: \"Reading.\"      DIMENSION 5:  Relapse/Continued Use/Continued Problem Potential  Consider the degree to which the client recognizes relapse issues and has the skills to prevent relapse of either substance use or mental health problems.     Relapse/Continued Use/Continued Problem Potential - Current Risk Factor:  2    Goals:  Increase awareness of the disease concept of addiction and insight into personal relapse triggers and strategies to address.   Goals:  Identify and address relapse triggers and cues.    Data: Client rated his \"Average Craving Rating\" on a scale of 1-10 (1=low, 10= high): 2.  Client reported \"work stress\" is his trigger and the way he is addressing his trigger or minimize his cravings this past week; \"stay busy with home projects.\" Client reported that he \"completed HVAC repairs for two friends\" to help him avoid using this past week.  Client reports that \"Anger\" was his biggest trigger for the week and he addressed it by \"hugged my son to change my thoughts.\"     DIMENSION 6:  Recovery Environment  Consider the degree to which key areas of the client's life are supportive of or antagonistic to treatment participation and recovery.     Recovery Environment - Current Risk Factor: 2    Support group meetings attended this week: 0  Where: N/A  If zero attendance, what s preventing you: Just started program.    Do you currently have a sponsor: No  Did you have contact with your sponsor this week? No    Did family agree to attend family week:  NA    If yes: NA    Goals:  Increase sober support network. " "Continue to identify and attend sober support group meetings.   upport network and be involved in sober activities.  Implement and/or improve positive daily structure and routine.  Build sober social support in community.  Implement and/or improve positive daily structure and routine.  Learn and practice communication and assertiveness skills.  Address relationship conflicts with family.    Data: Client rated their \"Living Situation\" on a scale of 1-10 (1=very helpful, 10=very stressful): 3. Client reports that he is living with his wife and son and reports that his wife is supportive and his son has mild signs of Autism. Client reports during the past week that he \"told my family and friends of my situation\" as a way to expand his sober support network. Client reports he enjoyed, \"doing hands on porjects\" for sober fun activities this past week.   Employment: Client reports he is employed full-time.  Volunteerism: No.     Intervention: Intervention: Counselor/therapist used Behavioral Therapy, Cognitive Behavioral Therapy, Counselor Feedback, Education, Emotional management, Group Feedback, Motivational Enhancement Therapy, Relapse Prevention, Twelve Step Facilitation, DBT and REBT.  Client learned and practiced the following skills in group this week: Meditation, breathing exercise, devotional and reflection.      12/17/2018: Client was a  this week and welcomed a client into the group process.  Client indicated that she was grateful to be in treatment.  Clients were educated on the 7 stages of Grief/Loss along with giving them \"Moving on After Loss and filling out \"Grief Sentence Completion\" assignments to share in group.       12/19/2018:  Client welcomed another client into the group process. Several clients presented their individual assignments in group.        12/20/2018:  Clients were able to share \"Moving on After Loss and other related assignments relating to Grief/Loss.  Clients completed " "\"Holiday Crisis Plan\" assignment       Assessment:    Stages of Change Model: Client is currently in the Stages of Change Model    Client appeared to be eager and ready to learn and apply new coping skills that he will obtain.  Client is very organized.     Plan:   -Client will spend time getting acclimated to the group.   -Client will follow all recommendations of counselor and any other medical provider which includes taking all medications as prescribed.    -Client will attend all weekly Phase 1 group sessions.   -Client to engage in sober activities each week.      Ramandeep Chandler MA, [facility]  "

## 2018-12-19 NOTE — PROGRESS NOTES
Comprehensive Assessment Summary     Based on client interview, review of previous assessments and   comprehensive assessment interview the following diagnosis and recommendations are:     Patient: Iker Stephenson  MRN; [id number]   : 1965  Age: 53 year old Sex: male  Client meets criteria for:  303.90 Alcohol Dependence  304.20 Cocaine Dependence  305.50 Opioids Abuse    Dimension One: Acute Intoxication/Withdrawal Potential     Ratin  (Consider the client's ability to cope with withdrawal symptoms and current state of intoxication)       Client reports that his last use of oxy and cocaine was on 2018 and he last used meth and alcohol around 2018.  Client displays no intoxication or withdrawal symptomology at this time.  Client denies having any feelings of withdrawal.  During his evaluation, client was given a UA and it was POS for cocaine substances tested.    Dimension Two: Biomedical Condition and Complications    Ratin  (Consider the degree to which any physical disorder would interfere with treatment for substance abuse, and the client's ability to tolerate any related discomfort; determine the impact of continued chemical use on the unborn child if the client is pregnant)     Upon admission, client reported having 4 neck fusions in late , chronic neck and back pain, high blood pressure, and diverticulitis in his colon.  However, client denies having any chronic biomedical conditions that would interfere with treatment or any recovery skills training/workshop. Client indicated that he sees Dr. Salomon Green at [facility] in [place] and reported having a physical 8 months ago.   Client also indicated that he is working Tammy Cheatham, gastrointestinal specialist at [facility] and has an upcoming appointment on .  Client reported taking Lisinopril daily. Client reports having pain at this time and reports his pain level is a 2 on the 0-10 Pain Rating Scale.   "Client has access to his medical provider if needed.    Dimension Three: Emotional/Behavioral/Cognitive Conditions & Complications  Ratin  (Determine the degree to which any condition or complications are likely to interfere with treatment for substance abuse or with functioning in significant life areas and the likelihood of risk of harm to self or others)     Client denies having any formal MH diagnoses and denies taking any medications for MH. Client denies a history of abuse and denies having family history of MI/CD. At the time of the assessment, Client' PHQ-9 score was 4 (minimal depression) and his SERJIO-7 score was 3 (minimal anxiety).  Client's [place]-Suicide Severity Rating Scale score was 0, Very Low Risk. Client appears to have minimal emotional and stress management skills and would benefit obtaining insights into his behavior and substance use to his physical and mental health. Client denies SIB, SA, suicidal thoughts at this time. C    Dimension Four: Treatment Acceptance/Resistance     Ratin  (Consider the amount of support and encouragement necessary to keep the client involved in treatment)       Client acknowledged having a problem with alcohol since about  and he has recognized that he has had a problem with oxycodone since he began buying it off the street 6 months ago. Client displays verbal compliance and motivation to stop.  Client reported his use has negatively impacted multiple areas of his life.Therefore, he lacks the education, cognitive and behavior changes needed for long term sobriety.  Client reported having 1 prior outpatient treatment for alcohol 20+ years ago.  Client appears to be in the \"contemplation\" stage within the Stages of Change Model. Client can only return to work once he has been enrolled in CD treatment programming    Dimension Five: Continued Use/Relaspe Prevention     Ratin  (Consider the degree to which the client's recognizes relapse issues and " "has the skills to prevent relapse of either substance use or mental health problems)        Client reports he attended an IOP CD treatment program 20-25 years ago for his drinking. He denies having ever attended a detox program or a 12 step meeting before. Client lacks education into his disease of addiction.  Client identified his relapse triggers for alcohol as \"I just like the way I feel. The drinking was the trigger for everything else.\" His triggers for oxy is \"pain\" and for cocaine, \"alcohol. If I don't drink, I don't think about it.\"  Sinan lacks impulse control and long-term sober maintenance skills.  Client is at a moderately high risk for continued use due if he does not complete IOP and follow all recommendations from his counselor and  employer.    Dimension Six: Recovery Environment     Ratin  (Consider the degree to which key areas of the client's life are supportive of or antagonistic to treatment participation and recovery)       Client currently lives with his wife and son. Client denied having any concerns regarding his immediate living environment or neighborhood. Client reported having a relationship conflict with his wife and his employer due to his alcohol abuse issues. Client is employed and he last worked 2018 due to being on a K[employer] because of his addiction.  Client denied having a history of legal issues, however, client reported having strict requirements that client will need to fulfill in order to maintain his employment.  Client denied having any increased financial stress.  Client lacks a current sober peer support network    I have reviewed the information on the assessment, psychosocial and medical history and checklist:        the following changes have been made: Added (668.64) Cocaine Dependance   "

## 2018-12-20 ENCOUNTER — BEH TREATMENT PLAN (OUTPATIENT)
Dept: BEHAVIORAL HEALTH | Facility: CLINIC | Age: 53
End: 2018-12-20

## 2018-12-20 ENCOUNTER — HOSPITAL ENCOUNTER (OUTPATIENT)
Dept: BEHAVIORAL HEALTH | Facility: CLINIC | Age: 53
End: 2018-12-20
Attending: SOCIAL WORKER
Payer: COMMERCIAL

## 2018-12-20 PROCEDURE — H2035 A/D TX PROGRAM, PER HOUR: HCPCS

## 2018-12-20 PROCEDURE — H2035 A/D TX PROGRAM, PER HOUR: HCPCS | Mod: HQ

## 2018-12-20 NOTE — PROGRESS NOTES
[facility]  Adult Chemical Dependency Program  Treatment Plan Requirements    These services are provided by the facility for each patient/client according to the individual's treatment plan:    Individual and group counseling    Education    Transition services    Services to address any co-occurring mental illness    Service coordination    Initial Treatment Plan Goals:  1. Complete all the requirements of Program Orientation.  2. Maintain medication compliance throughout the program.  3. Complete requirements for workshop/skills groups based on identified issues on your problem list.  4. Complete the support group attendance feedback sheet weekly.  5. Gain family involvement in treatment process to address family issues from the problem list.  6. Attend and participate in all required groups per individual treatment plan.  7. Focus attention to individualized issues from the treatment plan.  8. Complete all requirements for UA's, alcohol screening tests and other testing.  9. Schedule a physical examination if recommended.    In addition to the above, complete all individual goals as specifically outlines on your treatment plan.    Criteria for discharge:  Patients/clients are discharged from the program following completion of the entire program including Phase I and II or acceptance of other post-treatment referrals such as custodial house, or aftercare at other facilities.  Patients/clients may also be discharged for inappropriate behavior or chemical use.      Favorable Discharge - Patients/clients have completed agreed upon treatment goals, understand their diagnosis and appear motivated about the follow-up care.    Guarded Discharge - Patients/clients have demonstrated some understanding of their diagnosis and recovery process, and have completed some of their treatment goals.  This prognosis also includes patients/clients who have completed some treatment goals but have not made  commitment to community support or follow through with referrals.    Unfavorable Discharge - Patients/clients have not completed agreed upon treatment goals due to their own choice, have limited understanding of their diagnosis, and have shown minimal or inconsistent behavior conducive to recovery.  Those patients/clients discharged due to behavioral problems will also be unfavorable discharges.    Adult CD Treatment Plan                                Iker Stephenson  [id number]  1965  53 year old   Male    Acute Intoxication/Withdrawal Potential     DIMENSION 1  RISK FACTOR: 0    SUBSTANCE USE DISORDERS:    303.90 Alcohol Dependence  304.20 Cocaine Dependence  305.40 Opioids Abuse           Date Assigned Source Area of Treatment Focus / Goal / Treatment Strategies    Target  Date Initials Outcome Date Completed   12/20/2018 Self -  Current and Assessment -  Current  Area of Treatment Focus:  Substance use, cravings and urges. Last use date was reported as 12/5/2018 for oxy's and cocaine 1/12/2019, alcohol was on 11/27/2018.       Goal:  Develop effective strategies to maintain sobriety    Treatment Strategies:  1.  Report to counselor and group any alcohol or drug use.  2.  Client agrees to advise his counselor of any changes in his medications, or dosages of medications. Weekly, until approx. D/C date of   4/18/19   NK Effective 5.15.2019       Biomedical Conditions and Complaints     DIMENSION 2  RISK FACTOR: 1           Date Assigned Source Area of Treatment Focus / Goal / Treatment Strategies Target  Date Initials Outcome Date Completed   12/20/2018 Self -  Deferred and Assessment -  Deferred  Area of Treatment Focus:  Client reports having a Dr. Salomon Green and NP Tammy Cheatham, [facility] and last saw him on October 2018 for his diverticulitits.  Client has a medical diagnosis of high blood pressure, chronic neck and back pain and is being treated by medictions.    Goal:  Disclose CD status to  "medical providers and follow up with medical interventions while in treatment program.   Client will effectively manage his physical health and will follow recommendations of your medical provider.    Treatment Strategies:  1.  Client will meet with his PCP as needed/recommended.   2.  Take medications as prescribed and follow-up with medical interventions while in the program.  3.  Client will complete \"Alternative Methods for managing pain\" and share in group Weekly, until approx. D/C date of   4/18/19 01/16/19   NK Effective 1.16.2019       Emotional/Behavioral/Cognitive Conditions and Complications     DIMENSION 3  RISK FACTOR: 1             Date Assigned Source Area of Treatment Focus / Goal / Treatment Strategies Target  Date Initials Outcome Date Completed     12/20/2018 Self -  Referred and Assessment -  Referred/current  Area of Treatment Focus:   Client denies mental health diagnosis.  Client reports having a lot of stress with his job, guilt, grieving and worriest,  lack of boundaries, relational stressors, being overly emotional at times, irrational and struggling with impulsivity.   Client lacks understanding of addiction as a disease and how this disorder affects other aspects of mental and physical health.     Goal:  1.  Learn and utilize coping skills to manage his irrational believes and struggles with impulsivity in a healthy way.  2.  Increase awareness and understanding of the relationship between mental health and substance use.       Treatment Strategies:  1. Client will meet with primary counselor individually 1 hour biweekly to address mental health symptoms and their relationship to substance use recovery.    2.  Client will journal weekly on his emtions, stressors.  Client will report progress, insights, challenges, successes, etc. in group weekly.  3.  Client will practice writing 3 affirmations daily.  Client will report progress, insights, challenges, successes, " "etc. in group weekly.  4.  Client will complete \"Compulsive Behavior and Isolation: assignment.  Share in group.  5.  Client will complete Self-Defeating Beliefs and Behaviors\".  Share in group.                             4/18/19 4/18/19 4/18/19 02/28/19 1/16/19 NK Effective                             4.18.2019 4.18.2019 4.18.2019 2.28.2019 1.16.2019 12/20/2018 Self -  Referred and Assessment -  Referred  Area of Treatment Focus:   Client lacks understanding of addiction as a disease and how this disorder affects other aspects of mental and physical health.     Goal:  Learn how to apply self-care and psychotherapy to build a repertoire of daily habits that counteract PAWS, fatigue, depression and anxiety leading to increased resiliency and well-being.      Treatment Strategies:  Attend the following 3-hour groups:  > Neurobiology of addiction: Informs client of brain changes and reduces feelings of shame, instructs on steps to help heal.    > Learn and use Mindfulness to facilitate effective action in problem solving and promote health and well-being.     > Mental Health Part 1: Learn 3 core processes of Acceptance, Cognitive De-fusion, and Being Present.    > Mental Health Part 2: Learn 3 core processes of Self-as-Context, Values, and Committed Action.     > Learn and apply Self-Care in a variety of areas to improve mental and physical health, reduce PAWS, and increase feelings of self-empowerment, resiliency and well-being.    > Learn Stress Management techniques to reduce PAWS and stress-related symptoms, increase resiliency, and learn healthy routines to replace dysfunctional habits.      Area of Treatment Focus:  Iker has grief and loss issues, guilt over when he was using alcohol and cocaine and also reported that his job is very stressful. He has symptoms of irritability, guilt, inability to sleep for longer than a couple hours at a time. He would like to learn more " "about dealing with these issues during the PAWS period of recovery.      Goal: Iker will process through grief and loss and learn skills to work with his family through guilt issues. He will reduce his stressors and be able to sleep through most of the night. If insomnia does not improve he will be referred to his PCP to address.    Treatment Strategies:  1.  Iker will complete a diagnostic assessment with Alfreda Hickey, prior to group session on Friday, 1/11/19.   F41.8 (300.09): Other Specified Anxiety Disorder; generalized anxiety disorder, not occurring more days than not.     2. Iker met with Alfreda Hickey for individual therapy to explore anxiety and coping skills   Stress: 5-6 (with 10 high)   Sleep: \"sleeping really well\"\  Depression: Minimal  Grief (and guilt): \"It feels normal now, we were so close\"  Anxiety: 5 (moderate)     3. Iker met with Alfreda Hickey to explore his daily schedule of structure and routine, causes of his anxiety, and changes he is making to reduce stress.   Stress: 5 (with 10 high)   Sleep: No issues  Depression: No symptoms reported   Grief (and guilt): \"It feels normal now, we were so close\"  Anxiety: 4 (minimal)     4. Iker met with Alfreda Hickey for psychotherapy and worked on cognitive defusion and automatic negative thoughts today.   Stress: 5 (with 10 high)   Sleep: No issues  Depression: No symptoms reported   Grief (and guilt): \"It feels like normal missing someone, not as severe sadness.\"  Anxiety: 4 (minimal)     5. Iker met with Alfreda Hickey for session.   Stress: 3  Sleep: No issues  Grief: No issues  Guilt: still has feelings of guilt and shame    SERJIO-7 score 1 (minimal anxiety, 12/11/18 scored 3)  PHQ-9 score 3 (minimal depression, 12/11/18 scored 4) He notes no impairment on these screenings.     6. Iker met with Alfreda Hickey for session, reviewed progress, Anxiety was at 5, described as \"not as bad as when I started.\" His depression was 2-3 " "(with 10 high). His grief was down from 8 to 4 (with 10 high). Is practicing several coping skills regularly, added 2 more today.     7. Iker met with Alfreda for his psychotherapy session.  Stress: 1-2  Sleep: No issues, \"it's great, I feel great in the morning\"  Grief: \"I still miss her, not really grieving\"  SERJIO-7: 2 (minimal anxiety, down from last score)  PHQ-9: 0 (scored 0 symptoms of depression)  All 6 groups to be completed 1 time each by 2/28/19 unless excused by primary counselor. Client must complete all group work in each session to receive an  effective\" outcome.                                                   04/15/19                      RITA SALINAS      SK      SK      SK        SK                                         RITA SALINAS        SK  Effective                          Effective       Effective       Effective       Effective       Effective        Effective                                         Effective           Effective                 Effective                   Effective                  Effective                  Effective           [Excused]        Effective                            01/04/19       01/11/19      01/16/19      01/23/19       12/21/18         12/28/18                                         01/11/19           02/12/19                 02/26/19                  03/12/19                   03/26/19                 04/10/19           [04/24/19]        05/08/19           Readiness to Change     DIMENSION 4  RISK FACTOR: 1             Date Assigned Source Area of Treatment Focus / Goal / Treatment Strategies Target  Date Initials Outcome Date Completed   12/20/2018 Self -  Current and Assessment -  Current  Area of Treatment Focus:  Client has consequences to self and others due to.       Goal:  Increase awareness with how substance use is conflicted " "with personal values and address any ambivalence to change.   Address ambivalence regarding substance use and sobriety.  Increase insight into how use has affected you and those around you.       Treatment Strategies:  1.  Complete \"What do I believe In?\" assignment. Share with group.  2.  Complete \"10 Worst Consequences assignment.\"  Share with group.  3.  Client will complete \"Opening The Book of Me.\" assignment and share in his peers.  Client will complete \"Chemical Use History\" story and will then share it with the group.     Area of Treatment Focus:  Client lacks internal motivation to stop using substances.      Goal:  Increase and/or maintain internal motivation to achieve sobriety from substances.    Treatment Strategies:  1.  Complete \"What price am I willing to pay?\" assignment. Share with group.  2.  Complete \"How Far Have I come?\" assignment.  Share with group.                       1/9/19 2/6/19 1/19/19 2/28/19 2/13/19 2/20/19 NK Effective                     3.11.2019    2.16.2019    1.19.2019    2.28.2019                    2.13.2019      2.20.2019        Relapse/Continues Use/Continues Problem Potential     DIMENSION 5  RISK FACTOR: 3                 Date Assigned Source Area of Treatment Focus / Goal / Treatment Strategies Target  Date Initials Outcome Date Completed   12/20/2018 Self -  Current and Assessment -  Current  Area of Treatment Focus:  Client lacks insight into his relapse process along with early warning signs and triggers. Client does not recognize relapse triggers and warning signs.         Goal:  Increase awareness of the disease concept of addiction and insight into personal relapse process, triggers and strategies to address.  Identify and address relapse triggers and cues.      Treatment Strategies:  1.  Complete \"Identifying relapse triggers and cues\" assignment. Share with group.  2.  Client will utilize the coping skills of meditation, deep " "breathing excercise and REBT to manage stressorsand cravings. Client will report progress, insights, challenges, successes, etc. in group weekly.  3.  Client will complete \"Personal Recovery Care Plan\" assignment.  Share with group.  4.  Client will complete \"Quitting Alchol\" pamphlet and assignments.  Share with group.     Area of Treatment Focus:  Client does not recognize relapse triggers and warning signs.    Client has cross addiction with alcohol, THC and cocaine.    Client lacks a daily structure/routine that includes healthy and sober living skills.       Goal:  Help client develop the thinking and behaviors needed for long-term sobriety.  Establish a routine supportive of long-term sobriety.  Increase resiliency of stressful situations with alternative coping skills.    Treatment Strategies:  1.  Complete \"Relapse Prevention Plan\" packet. Share with group.  2.  Complete \"What's Happening In my early recovery\" assignment. Share with group.  3.  Client will read \"A Look at Cross-Addiction pamphlet\"  and share his insights during his group  4:  Client will write and share his insight as to his causation for his relapse and share in group.                      2/20    4/18/19      4/18/19    3/8/19                            2/15/19  1/19/19    1/20/19  2/15/19      1/21/19 NK Effective                   2.20    4.18        4.18    3.8                            2.15  1.19    1.20  2.15      1.21          Recovery Environment     DIMENSION 6  RISK FACTOR: 2                 Date Assigned Source Area of Treatment Focus / Goal / Treatment Strategies Target  Date Initials Outcome Date Completed   12/20/2018 Self -  Current and Assessment -  Current  Area of Treatment Focus:  Client lacks a sober support network and involvement in sober activities. Client lacks sober leisure activities.         Goal:  Expand sober support network and be involved in sober activities.  Implement and/or improve positive daily structure " "and routine.  Build sober social support in community.    Treatment Strategies:  Client will aim to attend 1 sober support groups per week.  Client will find a sponsor.    Area of Treatment Focus:  Client reports family/significant others are non-supportive.        Goal:  Implement and/or improve positive daily structure and routine.  Learn and practice communication and assertiveness skills.  Address relationship conflicts with family.    Treatment Strategies:  Client will invite family/concerned person to family week.  1.  Client will complete \"Understanding Family History\" assignment and share in group Last week of treatmentapprox date,  4/18/19 4/18/19 1/31/19 1/23/19 NK Effective 5.15.2019                                            2.10  3.11.2019     Individual abuse prevention plan (required for lodging plus) : specific actions, referral:   No additional protection measures required other than the Program Abuse Prevention Plan - No    All interventions that are designated as current will need to be completed in order to transition out of treatment with a favorable prognosis. The treatment plan is a flexible document and a work in progress. Interventions and goals may be added at any time to customize this plan to each individual's needs. Client may work with clinician to change interventions as long as they pertain to the goals stipulated in the plan and/or are clinically driven.  Grecia Bashir MS, [facility]    Acknowledgement of Current Treatment Plan - Initial Treatment Plan     INITIAL TREATMENT PLAN:     1. I have participated in creating my treatment plan with my therapist / counselor on _12/20/2018__.     I agree with the plan as it is written in the electronic health record.    Name Signature/Date   Client: Iker Stephenson     Name of Therapist / Counselor Signature/Date   Counselor/Therapist:    Grecia Bashir MS, [facility]      2. I have completed and reviewed my Safety " Plan with my counselor and signed this on _________. I have been given the hard copy of this plan.    Client signature/date:      ___________________________________________________________________12/20/2018     3. Last Use Date: __________    Client signature/date:     ___________________________________________________________________12/20/2018       Acknowledgement of Updated Current Treatment Plan       I have reviewed my treatment plan with my therapist / counselor on 1.15.2019. I agree with the plan as it is written in the electronic health record.    Name Signature   Iker Stephenson    Name of Therapist / Counselor    Grecia Bashir, MS, [facility]

## 2018-12-20 NOTE — PROGRESS NOTES
Patient Safety Plan Template    Name:   Iker Stephenson YOB: 1965 Age:  53 year old MR Number:  [id number]   Step 1: Warning signs (Thoughts, images, mood, situation, behavior) that a crisis may be developin. Stress - work related     2. Stress to keep my family life consistent     3. Isolation     Step 2: Internal coping strategies - Things I can do to take my mind off of my problems without contacting another person (relaxation technique, physical activity):     1. Playing Sports with my son     2. Playing games with my son     3. Physically stay busy     Step 3: People and social settings that provide distraction:     1. Name: Yaneth Stephenson   Phone: [phone]   2. Name: Russell Stephenson   Phone: [phone]   3. Place:Sporting Events with Family   4. Place: Family gatherings     The one thing that is most important to me and worth living for is: My family (Wife and son)     Step 4: People whom I can ask for help:     1. Name: Yaneth Stephenson   Phone: [phone]     2. Name: Russell Stephenson   Phone: [phone]     3. Name: NA   Phone: NA     Step 5: Professionals or agencies I can contact during a crisis:     1. Clinician Name: [facility]   Phone: [phone]   Clinician Pager or Emergency Contact #: NA     2. Clinician Name: SONJA   Phone: SONJA     Clinician Pager or Emergency Contact #: NA     3. Local Urgent Care Services: [facility]    Urgent Care Services Address: N/A    Urgent Care Services Phone: [phone]     4. Suicide Prevention Lifeline Phone: 7-626-203-HIPE ([phone])     Step 6: Making the environment safe:     1. Remove alcohol from my home     2. Inform my friends and family of my treatment plan.      Safety Plan Template 2008 Indira Ivey and Lenin Chavez is reprinted with the express permission of the authors.  No portion of the Safety Plan Template may be reproduced without the express, written permission.  You can contact the authors at  [email or web address] or [email or web address].

## 2018-12-21 ENCOUNTER — HOSPITAL ENCOUNTER (OUTPATIENT)
Dept: BEHAVIORAL HEALTH | Facility: CLINIC | Age: 53
End: 2018-12-21
Attending: SOCIAL WORKER
Payer: COMMERCIAL

## 2018-12-21 PROCEDURE — H2035 A/D TX PROGRAM, PER HOUR: HCPCS | Mod: HQ

## 2018-12-22 NOTE — PROGRESS NOTES
"Iker Stephenson  December 21, 2018  [id number]    [facility] Mental Health Process Group Note      Day and Date and Time of Service:  Friday, 12/21/18      Number of participants:  3      Length of Group:  3 hours      Goal of the Group: Learn aspects of self-care to increase self-empowerment and reduce the impact of post-acute withdrawal symptoms. Learn how self-care can lead to clearer thinking and increased physical and mental resiliency.      [place]: Group Topics and Activities      I.  D3 Intervention and Group Topic addressed: Self-Care and Self-Compassion; Self-Care quiz and goals for next week     II. Mindfulness and/or Motivational Component: Self-Care Evaluation, Worksheet to prioritize needs, Laughter as Self-Care     III. Additional Activity: Completed a self-care evaluation and then prioritized what they felt was lacking and identified what aspect of self-care to address first. Then, developed steps to start this week to improve this area and be more proactive in their own healthcare routine.                      IV. Review of Progress:   A. Client s self-report: Today was Iker's first group with this therapist and he shared that his first day was 12/19/18 and his DOC was alcohol. He added details that he also used cocaine with the alcohol and he also has a history of opiates after he was given prescriptions for the pain of both oxycodone and percocet. This opiate use was after \"4 neck fusions\" and he also bought opiates off the street but this was several years ago and not recent.  He started missing work the last 6 months and became tired of \"hiding and lying.\" He received his journal and agreed to do this each night. He learned mindfulness \"awareness of sounds.\" He said his stress is a 3 and he has had 0 cravings this week.      B. Therapist s Assessment:   Iker participated well in group and stayed engaged and asked relevant questions.   The self-care area to address this week:    " "Mindfulness/Humor  The first step to work on this week:  \"Take my time and he checked that he wants to 'learn to sit with negative thoughts' \"          V.   Reflection and evaluation of today s group experience:  Gave verbal feedback and filled out evaluation form. The most important thing learned today was       VI. Assignments or activities to do for next week:  Bhavik will begin to journal before bed, do at least one mindfulness exercise a day, and follow the steps s/he created regarding the self-care identified as most urgent to address.       Therapeutic techniques in this session:  Motivational Therapy, Supportive, Behavioral Modification and Mindfulness      Additional Treatment Referrals/Follow-up Issues to Address: Follow-up is not indicated at this time.       Change Treatment Plan:  No, however, this group does fulfill one intervention under D3.       Change Diagnosis: No changes this week.    "

## 2018-12-26 ENCOUNTER — HOSPITAL ENCOUNTER (OUTPATIENT)
Dept: BEHAVIORAL HEALTH | Facility: CLINIC | Age: 53
End: 2018-12-26
Attending: SOCIAL WORKER
Payer: COMMERCIAL

## 2018-12-26 PROCEDURE — H2035 A/D TX PROGRAM, PER HOUR: HCPCS

## 2018-12-26 PROCEDURE — H2035 A/D TX PROGRAM, PER HOUR: HCPCS | Mod: HQ

## 2018-12-26 ASSESSMENT — ANXIETY QUESTIONNAIRES
7. FEELING AFRAID AS IF SOMETHING AWFUL MIGHT HAPPEN: NOT AT ALL
5. BEING SO RESTLESS THAT IT IS HARD TO SIT STILL: NOT AT ALL
4. TROUBLE RELAXING: MORE THAN HALF THE DAYS
GAD7 TOTAL SCORE: 10
IF YOU CHECKED OFF ANY PROBLEMS ON THIS QUESTIONNAIRE, HOW DIFFICULT HAVE THESE PROBLEMS MADE IT FOR YOU TO DO YOUR WORK, TAKE CARE OF THINGS AT HOME, OR GET ALONG WITH OTHER PEOPLE: NOT DIFFICULT AT ALL
2. NOT BEING ABLE TO STOP OR CONTROL WORRYING: NEARLY EVERY DAY
3. WORRYING TOO MUCH ABOUT DIFFERENT THINGS: NOT AT ALL
1. FEELING NERVOUS, ANXIOUS, OR ON EDGE: MORE THAN HALF THE DAYS
6. BECOMING EASILY ANNOYED OR IRRITABLE: NEARLY EVERY DAY

## 2018-12-26 ASSESSMENT — PATIENT HEALTH QUESTIONNAIRE - PHQ9: SUM OF ALL RESPONSES TO PHQ QUESTIONS 1-9: 12

## 2018-12-26 NOTE — ADDENDUM NOTE
Encounter addended by: Ramandeep Chandler [facility] on: 12/26/2018 12:55 PM   Actions taken: Delete clinical note

## 2018-12-27 ENCOUNTER — HOSPITAL ENCOUNTER (OUTPATIENT)
Dept: BEHAVIORAL HEALTH | Facility: CLINIC | Age: 53
End: 2018-12-27
Attending: SOCIAL WORKER
Payer: COMMERCIAL

## 2018-12-27 PROCEDURE — H2035 A/D TX PROGRAM, PER HOUR: HCPCS | Mod: HQ

## 2018-12-27 ASSESSMENT — ANXIETY QUESTIONNAIRES: GAD7 TOTAL SCORE: 10

## 2018-12-27 NOTE — PROGRESS NOTES
"D:  Met with client on 12/26/2018 from 4:30 to 5:30. We discussed client's first treatment experience.  Client talked about \"he used up all his dopomine\" due to his drug use.\"  Client talked about all his physical health surgeries and infusions for past 10 years.  Client reported that he has been struggling with sleep for majority of his life.  He states that he can only sleep for 2 hour increments before he wakes up and struggles to be able to sleep through out the night.  Client reported that he is under extreme pressure and has to put forth a lot of physical exertion for his job.  Client also shared his concerns about feeling sad and depressed.  Client shared that he is holding in a lot of grief/loss over the death of his mom and unborn child.  Due to his losses, he is also holding a lot of guilt/shame over being dishonest and feeling that he is a disappointment to his mother.      I:  Individual session with client.  Provided client with verbal interventions including, validation, nurturing, compassion and support. Counselor provided a provisional DA relating to his depression and anxiety.  Client was encouraged to see his primary physician for possible medications that will address his depression and anxiety.       A:  Client appears to be amenable to change and is willing to follow whatever recommendations are being made to him.  Client will make a doctor's appointment to address his mental health symptoms.  Client also talks in great lengths about his surgeries and his reasoning why he had to take his pain medications.   Client also displays harshnish on himself and struggles knowing or wanting to forgive himself and also allow himself to be a human being where he will not allow himself to make mistakes.  It would benefit client into completing a DA.         P: Client's next counseling session is on 1/9/2019 at 7:30 p.m.                                                                                           "

## 2018-12-27 NOTE — PROGRESS NOTES
"  Key to reading dimensions:   -Bolded text in a dimension indicates information about a client at service initiation.  -Underlined portions in a dimension indicate important information that writer is carrying forward from week to week as a reminder.    CD ADULT Progress Note     Treatment Plan Review completed on:  12/27/2018     Attendance Dates: Phase 1 group sessions on 12/26, 12/27, 12/28//2018     Total # of Group Sessions for Phase I: 6  Total # of Group Sessions in Phase II:  N/A  Total # of Group Sessions in Phase III:  N/A  Total # of 1:1 Sessions: 3.       MONDAY TUESDAY WEDNESDAY THURSDAY FRIDAY SATURDAY SUNDAY TOTAL HOURS   Group Therapy Holiday  2 2 3   7   Specialty Groups*            1:1   1     2   Family Program           [place]             Phase III             Absent (place \"X\")             Total's   3 2 3   8     *Specialty Groups include Mental Health Care, Assertiveness and Communication, Sobriety Maintenance Skills, Spiritual Care, Stress Management, Relapse Prevention, Family Systems.                             Learning Style:    Hands on    Staff member contributing: Ramandeep Chandler MA, [facility]     Received supervision: No.    Client contributed to goals and plan: Yes (explain) - New Plan.    Client received a signed copy of treatment plan/revised plan: Yes    Changes to Treatment Plan: No.    Client agrees with plan/revised plan: Yes    Any changes in Vulnerable Adult Status:  No    1) Care Coordination Activities: Jonathan JOY psychotherapist  2) Medical, Mental Health and other appointments the client attended: SEE DIM's II & III below.  3) Medication issues: No.  4) Physical and mental health problems: SEE DIM's II & III below.  5) Review and evaluation of the individual abuse prevention plan: N/A     Substance Use Disorders:    303.90 (F10.20) Alcohol Use Disorder Severe  305.20 (F12.10) Cannabis Use Disorder Mild  305.60 (F14.10) Cocaine Use Disorder Mild    ASAM Risk Ratings and Data " "    DIMENSION 1: Acute Intoxication/Withdrawal  The client's ability to cope with withdrawal symptoms and current state of intoxication     Acute Intoxication/Withdrawal - Current Risk Factor:  0    Reporting a sober date of: 10/16/2018 for Alcohol.    Goals:  Develop effective strategies to maintain sobriety. Report any substance or alcohol use to both counselor and the group.     Data: Client denied nor demonstrated any signs/symptomology of intoxication and/or withdrawal. Client denied having any symptoms of PAW. Client confirmed his last use date was on 12/5/2018 for cocaine and oxy.       DIMENSION 2:  Biomedical Conditions and Complaints  The degree to which any physical disorder would interfere with treatment for substance abuse and the client's ability to tolerate any related discomfort     Biomedical Conditions and Complaints - Current Risk Factor:  1    Goals: Disclose CD status to medical providers and follow up with medical interventions while in DOP.     Goal: Client will effectively manage his physical health and will follow recommendations of your medical provider.    Data: Client sees Dr. Salomon Green and NP Tammy Cheatham, [facility] diverticulitits, high blood pressure, chronic neck and back pain.      Client rated his overall health on a scale of 1-10 (1=poor, 10=excellent): 8. Client reports doing the following for self-care during the past week; \"having some down time before going to sleep.\" Client reports the following changes to his physical health over the past week; No changes\". Client reports the following upcoming doctor s appointments: Colonoscopy, January 10, 2018\".     DIMENSION 3:  Emotional/Behavioral/Cognitive Conditions and Complications  The degree to which any condition or complications are likely to interfere with treatment for substance abuse or with function in significant life areas and the likelihood of risk of harm to self or others.     Emotional/Behavioral - " "Current Risk Factor:  1    DSM-5 Diagnoses:   Client denies mental health diagnosis  Suicide Assessment:   Risk Status    Ideation - Active thoughts of suicide Intent to follow through on suicide Plan for completing suicide    Yes No Yes No Yes No   Emergent  x  x  x   Urgent / Non-Emergent  x  x  x   Non- Urgent  x  x  x   No Current/Active Risk  x  x  x      Goals: To develop affective tools to manage his emotions.  Goal:  Learn and utilize coping skills to manage his irrational believes and struggles with impulsivity in a healthy way.  Goals:  Increase awareness and understanding of the relationship between mental health and substance use.     Data: Client reports feeling the following two emotions today; \"Anxious and happy.\" Client rated the following MH symptoms on a scale of 1-10 (0=did not endorse): sleeplessness- 5, fatigue- 5, difficulty concentrating- 2, mood swings- 4, irritability- 4, sadness- 2, excessive sleep- 1, racing thoughts- 2, hopelessness- 0. Client denies experiencing any other aforementioned symptomology over the past week. Client reports taking the following psychotropic medications; Lisinopril. Client identified his 2 stressors of Work and Fatherhood.  He is addressing his 2 stressors by \"talking about issues.\"  Client was given a provential diagnosis of depression and anxiety.  Client has a 1/11/19 to obtain a full DA by Alfreda Hickey.  Client denied suicidal ideation or self-injurious behavior.    DIMENSION 4:  Readiness to Change  Consider the amount of support and encouragement necessary to keep the client involved in treatment.     Readiness to Change - Current Risk Factor:  1    Goals:  Increase awareness with how substance use is conflicted with personal values and address any ambivalence to change.   Goals:  Address ambivalence regarding substance use and sobriety.  Goals:  Increase insight into how use has affected you and those around you  Goals:  Increase and/or maintain internal " "motivation to achieve sobriety from substances.    Data: Client rated their motivation of the week on a scale of 1-10 (1=low, 10= high): 10. Client reports his main motivation for sobriety is \"my family and health\". Client reports \"when he finds himself having a lot of free time and lack structure, he admits that he has a tendnacy of making wrong decisions\"  is what blocks motivation for sobriety for him. Client reports using the following coping strategies that are helpful for his continued sobriety: \"Talking to my wife.\" Client reports wanting to learn the following coping skills and/or to practice: \"patience.\"      DIMENSION 5:  Relapse/Continued Use/Continued Problem Potential  Consider the degree to which the client recognizes relapse issues and has the skills to prevent relapse of either substance use or mental health problems.     Relapse/Continued Use/Continued Problem Potential - Current Risk Factor:  2    Goals:  Increase awareness of the disease concept of addiction and insight into personal relapse triggers and strategies to address.   Goals:  Identify and address relapse triggers and cues.    Data: Client rated his \"Average Craving Rating\" on a scale of 1-10 (1=low, 10= high): 2.  Client reported \"free time\" is his trigger and the way he is addressing his trigger or minimize his cravings this past week; \"staying busy around house such running errands, cleaning and projects around the house.\" Client reported that he \"having family interaction during the [place] holidays\" helped him avoid using this past week.  Client reports that \"going back to work\" was his biggest trigger for the week and he addressed it by \"meeting with his supervisor and setting boundaries.\"     DIMENSION 6:  Recovery Environment  Consider the degree to which key areas of the client's life are supportive of or antagonistic to treatment participation and recovery.     Recovery Environment - Current Risk Factor: 2    Support group " "meetings attended this week: 0  Where: N/A  If zero attendance, what s preventing you: Have not signed up yet as client just started treatment.     Do you currently have a sponsor: No  Did you have contact with your sponsor this week? No    Did family agree to attend family week:  NA    If yes: NA    Goals:  Increase sober support network. Continue to identify and attend sober support group meetings. Goal  Goal:  Implement and/or improve positive daily structure and routine.  Goal:  Build sober social support in community.  Goal:  Learn and practice communication and assertiveness skills.  Goal;  ddress relationship conflicts with family.    Data: Client rated their \"Living Situation\" on a scale of 1-10 (1=very helpful, 10=very stressful): 2. Client reports that he is living with his wife and son and reports that his wife is supportive and his son has mild signs of Autism. Client reports during the past week, he \"told all his siblings about treatment\" as a way to expand his sober support network. Client also shared that he is working with a friend to get himself into a [facility] support group.  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 Client denies having any legal issues.  Client indicated that he has supportive employer who has put some requirements of client in order for client to keep his job.    Employment: Client reports he is employed full-time.  Volunteerism: No.     Intervention: Intervention: Counselor/therapist used Behavioral Therapy, Cognitive Behavioral Therapy, Counselor Feedback, Education, Emotional management, Group Feedback, Motivational Enhancement Therapy, Relapse Prevention, Twelve Step Facilitation, DBT and REBT.  Client learned and practiced the following skills in group this week: Meditation, breathing exercise, devotional and reflection.    12/126/2018:  Client completed \"Moving on after Loss\" assignment.  This " "was given for those clients who are experiencing loss or loss, to provide suggestions for action to resolve grief and loss.  This activity helps the client to take an active role in determining how they will continue living their life and regain as much quality of life as possible despite the loss.      12/27/2018:  Clients were shown a video from \"The Addictive [place]\" by Professor Ji Marinelli.  There will be 12 series to this video.  1/12 series entitled  Addiction 101\".  They were provided some background on drugs and addiction.  The goal is to help the clients develop a better understanding of addiction, how it works, and how it manages to take hold of so many people around the world.    Assessment:    Stages of Change Model: Client is currently in the Stages of Change Model    Client appeared to be eager and ready to learn and apply new coping skills that he will obtain.  Client is very organized.     Plan:   -Client will spend time getting acclimated to the group.   -Client will follow all recommendations of counselor and any other medical provider which includes taking all medications as prescribed.    -Client will attend all weekly Phase 1 group sessions.   -Client to engage in sober activities each week.      Ramandeep Chandler MA, [facility]  "

## 2018-12-28 ENCOUNTER — HOSPITAL ENCOUNTER (OUTPATIENT)
Dept: BEHAVIORAL HEALTH | Facility: CLINIC | Age: 53
End: 2018-12-28
Attending: SOCIAL WORKER
Payer: COMMERCIAL

## 2018-12-28 PROCEDURE — H2035 A/D TX PROGRAM, PER HOUR: HCPCS | Mod: HQ

## 2018-12-29 NOTE — PROGRESS NOTES
"Iker Stephenson  December 28, 2018  [id number]    [facility] Mental Health Process Group Note      Day and Date: Friday, 12/28/18        Number of participants: 5      Length of Group: 3 hours       Goal of the Group: Learn the importance of Stress Management and techniques to reduce PAWS and stress-related symptoms, increase resiliency, and learn healthy routines to replace dysfunctional habits       [place]: Group Topics and Activities      I.  D3 Intervention and Group Topic addressed: Stress management, productivity, balance and procrastination     II. Mindfulness and/or Motivational Component: Group completed progressive relaxation and deep breathing techniques and identified skills to work on this week. Group also did the mindfulness exercise Awareness of Sounds       III. Additional Activity: Clients identified personal stressors and if/how addiction intensified their stress. They completed the [place] exercise that involves the mindfulness work.  The group also learned tips to overcome procrastination and the benefits of  de-cluttering  for mental health, and the importance of humor in self-care.      IV. Review of Progress:   A. Client s self-report:  Iker reported having \"a pretty good week\" and spending lots of time with family over the holiday. His younger brother offered him a beer because he didn't know he was in recovery but he said the family drinking around him didn't bother him and his brother was told about his treatment.  He did his journal twice this wee and he said he has quiet time after everyone else goes to bed so this is his mindfulness time.  His stress is at 4 and he reported no cravings this week.       B. Therapist s assessment:  Iker participates well in group and he said that his boss called and he can go back to work Monday instead of Wed and this has made him very happy. He said one of his warning signs for stress is irritability.      V.   Reflection and evaluation of today s group " "experience:  Gave verbal feedback and filled out evaluation form. Client wrote the most important thing learned today was    \"That stress relief is very important to human brain function that dictates health and well-being\" and one concern he had was \"I am going to work with my employer in shortening my work load that will hep with less stress. Re: No on-call schedule.\" He also wrote that he was surprised that he felt \"I am full of stress.\"      VI. Assignments or activities to do for next week: Continue to do journal before bed, do at least one mindfulness exercise in the day time, address one self-care area a week, incorporate one stress reduction technique into daily schedule of structure and routine     Therapeutic techniques in this session:  Motivational Therapy, CBT/DBT/ACT, Supportive, Behavioral Modification and Mindfulness      Additional Treatment Referrals/Follow-up Issues to Address: Not needed at this time.      Change in Treatment Plan: No change, this group completes one intervention in Treatment Plan under Dimension 3.        Change in Diagnosis: No change in diagnosis indicated.      "

## 2018-12-31 ENCOUNTER — HOSPITAL ENCOUNTER (OUTPATIENT)
Dept: BEHAVIORAL HEALTH | Facility: CLINIC | Age: 53
End: 2018-12-31
Attending: SOCIAL WORKER
Payer: COMMERCIAL

## 2018-12-31 PROCEDURE — H2035 A/D TX PROGRAM, PER HOUR: HCPCS | Mod: HQ

## 2019-01-02 ENCOUNTER — HOSPITAL ENCOUNTER (OUTPATIENT)
Dept: BEHAVIORAL HEALTH | Facility: CLINIC | Age: 54
End: 2019-01-02
Attending: SOCIAL WORKER
Payer: COMMERCIAL

## 2019-01-02 PROCEDURE — H2035 A/D TX PROGRAM, PER HOUR: HCPCS | Mod: HQ

## 2019-01-02 NOTE — PROGRESS NOTES
"  Key to reading dimensions:   -Bolded text in a dimension indicates information about a client at service initiation.  -Underlined portions in a dimension indicate important information that writer is carrying forward from week to week as a reminder.    CD ADULT Progress Note     Treatment Plan Review completed on:  1/3/2018     Attendance Dates: Phase 1 group sessions on 12/31/2018, 01/02/2019, 01/03 and 01/4/2019    Total # of Group Sessions for Phase I: 10  Total # of Group Sessions in Phase II:  N/A  Total # of Group Sessions in Phase III:  N/A  Total # of 1:1 Sessions: 4.       MONDAY TUESDAY WEDNESDAY THURSDAY FRIDAY SATURDAY SUNDAY TOTAL HOURS   Group Therapy 2  2 2 3   7   Specialty Groups*            1:1        2   Family Program           [place]             Phase III             Absent (place \"X\")             Total's   3 2 3   8     *Specialty Groups include Mental Health Care, Assertiveness and Communication, Sobriety Maintenance Skills, Spiritual Care, Stress Management, Relapse Prevention, Family Systems.                             Learning Style:    Hands on    Staff member contributing: Ramandeep Chandler MA, [facility]     Received supervision: No.    Client contributed to goals and plan: Yes (explain) - New Plan.    Client received a signed copy of treatment plan/revised plan: Yes    Changes to Treatment Plan: No.    Client agrees with plan/revised plan: Yes    Any changes in Vulnerable Adult Status:  No    1) Care Coordination Activities: Jonathan JOY psychotherapist  2) Medical, Mental Health and other appointments the client attended: SEE DIM's II & III below.  3) Medication issues: No.  4) Physical and mental health problems: SEE DIM's II & III below.  5) Review and evaluation of the individual abuse prevention plan: N/A     Substance Use Disorders:    303.90 (F10.20) Alcohol Use Disorder Severe  305.20 (F12.10) Cannabis Use Disorder Mild  305.60 (F14.10) Cocaine Use Disorder Mild    ASAM Risk Ratings and " "Data     DIMENSION 1: Acute Intoxication/Withdrawal  The client's ability to cope with withdrawal symptoms and current state of intoxication     Acute Intoxication/Withdrawal - Current Risk Factor:  0    Reporting a sober date of: 12/5/2018 for Alcohol.    Goals:  Develop effective strategies to maintain sobriety. Report any substance or alcohol use to both counselor and the group.     Data: Client denied nor demonstrated any signs/symptomology of intoxication and/or withdrawal. Client denied having any symptoms of PAW. Client confirmed his last use date was on 12/5/2018 for cocaine and oxy.       DIMENSION 2:  Biomedical Conditions and Complaints  The degree to which any physical disorder would interfere with treatment for substance abuse and the client's ability to tolerate any related discomfort     Biomedical Conditions and Complaints - Current Risk Factor:  1    Goals: Disclose CD status to medical providers and follow up with medical interventions while in DOP.     Goal: Client will effectively manage his physical health and will follow recommendations of your medical provider.    Data: Client sees Dr. Salomon Green and NP Tammy Cheatham, [facility] diverticulitits, high blood pressure, chronic neck and back pain.      Client rated his overall health on a scale of 1-10 (1=poor, 10=excellent): 7. Client reports doing the following for self-care during the past week; \"resting and breathing exercise for 5 minutes every night before going to bed.\"  Client reports the following changes to his physical health over the past week; \" improvement with his diverticulitis\"  Client reports the following upcoming doctor s appointments: Colonoscopy, January 10, 2018\".     DIMENSION 3:  Emotional/Behavioral/Cognitive Conditions and Complications  The degree to which any condition or complications are likely to interfere with treatment for substance abuse or with function in significant life areas and the likelihood " "of risk of harm to self or others.     Emotional/Behavioral - Current Risk Factor:  1    DSM-5 Diagnoses:   Client denies mental health diagnosis, provisional dx of depression and anxiety  Suicide Assessment:   Risk Status    Ideation - Active thoughts of suicide Intent to follow through on suicide Plan for completing suicide    Yes No Yes No Yes No   Emergent  x  x  x   Urgent / Non-Emergent  x  x  x   Non- Urgent  x  x  x   No Current/Active Risk  x  x  x      Goals: To develop affective tools to manage his emotions.  Goal:  Learn and utilize coping skills to manage his irrational believes and struggles with impulsivity in a healthy way.  Goals:  Increase awareness and understanding of the relationship between mental health and substance use.     Data: Client reports feeling the following two emotions today; \"Anxious and happy.\" Client rated the following MH symptoms on a scale of 1-10 (0=did not endorse): sleeplessness- 2, fatigue- 1, difficulty concentrating- 3, mood swings- 4, irritability- 4, sadness- 1, excessive sleep- 1, racing thoughts- 3, hopelessness- 1. Client denies experiencing any other aforementioned symptomology over the past week. Client reports taking the following psychotropic medications; Lisinopril. Client identified his 2 stressors of Work and Fatherhood.  He is addressing his 2 stressors by \"mindfullness and breathing exercise (4/8).\"  Client was given a DA packet to complete and will complete his DA assessment on 1/11/19 with Alfreda Hickey.  Client denied suicidal ideation or self-injurious behavior.    DIMENSION 4:  Readiness to Change  Consider the amount of support and encouragement necessary to keep the client involved in treatment.     Readiness to Change - Current Risk Factor:  1    Goals:  Increase awareness with how substance use is conflicted with personal values and address any ambivalence to change.   Goals:  Address ambivalence regarding substance use and sobriety.  Goals:  " "Increase insight into how use has affected you and those around you  Goals:  Increase and/or maintain internal motivation to achieve sobriety from substances.    Data: Client rated their motivation of the week on a scale of 1-10 (1=low, 10= high): 7. Client reports his main motivation for sobriety is \"my family\". Client reports \" free time\"  is what blocks motivation for sobriety for him. Client reports using the following coping strategies that are helpful for his continued sobriety: \"playing his guitar, listen to music and finding time for me.\" Client reports wanting to learn the following coping skills and/or to practice: \"additional mindfulness skills.\"  Client completed his \"Understanding Family History\" assignment and presented it to his group.      DIMENSION 5:  Relapse/Continued Use/Continued Problem Potential  Consider the degree to which the client recognizes relapse issues and has the skills to prevent relapse of either substance use or mental health problems.     Relapse/Continued Use/Continued Problem Potential - Current Risk Factor:  2    Goals:  Increase awareness of the disease concept of addiction and insight into personal relapse triggers and strategies to address.   Goals:  Identify and address relapse triggers and cues.    Data: Client rated his \"Average Craving Rating\" on a scale of 1-10 (1=low, 10= high): 1.  Client reported \"haing using cocaine dreams\" is his trigger and the way he is addressing his trigger or minimize his cravings this past week; \"staying busy and having To do List.\" Client reported that he \"family time , a lot of time with my son\" helped him avoid using this past week.  Client reported the one thing he struggled with this week was completing this paperwork because it is very repetitious.\"  Client indicated that he was tired of talking about \"feelings\" and admitted that he picked the wrong day and time to fill out his check in sheet.  Client indicated that his son was very " "challenging this past week and he relates it to being around him 24/7 prior to client returning to work.  Client indicated that he was able to turn the reign over to his wife and stepped out of the picture.    DIMENSION 6:  Recovery Environment  Consider the degree to which key areas of the client's life are supportive of or antagonistic to treatment participation and recovery.     Recovery Environment - Current Risk Factor: 2    Support group meetings attended this week: 0  Where: N/A  If zero attendance, what s preventing you: Client indicated that he had a Smart Recovery meeting scheduled for Tuesday's but will not have to find another date when his group switches. .     Do you currently have a sponsor: Yes  Did you have contact with your sponsor this week? yes    Did family agree to attend family week:  NA    If yes: NA    Goals:  Increase sober support network. Continue to identify and attend sober support group meetings. Goal  Goal:  Implement and/or improve positive daily structure and routine.  Goal:  Build sober social support in community.  Goal:  Learn and practice communication and assertiveness skills.  Goal;  ddress relationship conflicts with family.    Data: Client rated their \"Living Situation\" on a scale of 1-10 (1=very helpful, 10=very stressful): 1. Client reports that he is living with his wife and son and reports that his wife is supportive and his son has mild signs of Autism. Client reports during the past week, he \"talked to Smart Recovery facilitator\" as a way to expand his sober support network. Client reports he enjoyed, \"bowling, work on my house, cooking\" for sober fun activities this past week. Client denies having any legal issues.  Client indicated that he was able to \"curb his anger\" when he was informed of not getting paid for [place] since he was on medical leave.  Client indicated that in the past, he would have argued with his boss until he got his way.  Client indicated that " "he is learning to accept things for it is.      Employment: Client reports he is employed full-time.  Volunteerism: No.     Intervention: Intervention: Counselor/therapist used Behavioral Therapy, Cognitive Behavioral Therapy, Counselor Feedback, Education, Emotional management, Group Feedback, Motivational Enhancement Therapy, Relapse Prevention, Twelve Step Facilitation, DBT and REBT.  Client learned and practiced the following skills in group this week: Meditation, breathing exercise, devotional and reflection.    12/31/2018:  Client's participated in checking in process, devotional reading along with sharing their insights on the devotional topic and meditation.  Clients also welcomed a new peer into the group process by demonstrating what happens in our group, read the group ground rules and explained the REBT modality that we use.  Group discussion about their new New Years [place] plans and setting up barriers if they struggled through the holidays.    1/2/2019:  Group discussion on the devotional topic of \"perfectionism\".  Education on the 12 step process, looking for support groups.  Client presented his \"Undertanding Family History\" assignment.  Client was able to verbalize and articulate what he learned from his assignment and presented it to his peers.   He shared that he was able to answer some of his own questions about his family and it makes more sense now as an adult.  He also admits that he has harmed more people than he realized due to his alcohol and substance use in the past.      1/3/3019:  Clients were shown a video from \"The Addictive [place]\" by Professor Ji Marinelli series entitled \"How Addiction Hijacks the Brain\" Lecture 3 followed with group discussions.  Clients were educated about what is going on in the brain's reward system following chronic drug use and how that can lead to addiction, learned how behavioral symptoms such as tolerance, withdrawal, and dependence develops as a result of " changes that occur in the brain.      Assessment:    Stages of Change Model: Client is currently in the Stages of Change Model    Client is very cooperative and willing to follow whatever recommendation is made for him.  Client also shared that he is very angry at God and that he does not believe or want to work with the 12 Step and that when he goes to [place], he talks to his  mother instead of God.  He indicated believing that he has total control of his life, what happens to him and that God has nothing to do with his life because of his past unmet prayers.    Plan:   -Client will spend time getting acclimated to the group.   -Client will follow all recommendations of counselor and any other medical provider which includes taking all medications as prescribed.    -Client will attend all weekly Phase 1 group sessions.   -Client to engage in sober activities each week.      Ramandeep Chandler MA, [facility]

## 2019-01-03 ENCOUNTER — HOSPITAL ENCOUNTER (OUTPATIENT)
Dept: BEHAVIORAL HEALTH | Facility: CLINIC | Age: 54
End: 2019-01-03
Attending: SOCIAL WORKER
Payer: COMMERCIAL

## 2019-01-03 PROCEDURE — H2035 A/D TX PROGRAM, PER HOUR: HCPCS | Mod: HQ

## 2019-01-04 ENCOUNTER — HOSPITAL ENCOUNTER (OUTPATIENT)
Dept: BEHAVIORAL HEALTH | Facility: CLINIC | Age: 54
End: 2019-01-04
Attending: SOCIAL WORKER
Payer: COMMERCIAL

## 2019-01-04 PROCEDURE — H2035 A/D TX PROGRAM, PER HOUR: HCPCS | Mod: HQ

## 2019-01-05 NOTE — PROGRESS NOTES
"Iker Stephenson  January 4, 2019  [id number]    [facility] Mental Health Process Group Note      Day and Date:  Friday, 1/04/19         Number of participants:  7      Length of Group: 3 hours     Goal of the Group: Learn current neuroscience of addiction to better understand addiction-as-a-disease and to reduce confusion, shame and guilt leading to increased open-mindedness and psychological flexibility needed to build resilience. Clients learn what is meant by  retrain the brain.        [facility]: Group Topics and Activities      I.  D3 Intervention and Group Topic addressed: Neuroscience and the importance of treatment     II. Mindfulness and/or Motivational Component:  Shantanu Miranda 18-minute talk on the power of addiction and the value of compassion.    III. Additional Activity: Group learned the different roles of various areas of the brain and how these are changed by addiction.  They learned how these changes manifest in behaviors and lead to consequences. They learned how to strengthen the frontal cortex to reduce the impact of cravings and  using  thoughts emanating from the midbrain.  Group compared different areas of addiction that society may condone and others that still have stigma attached to them.           IV. Review of Progress:   A. Client self-report:  Meagan shared that he had a good week and this was his first \"full\" week back to work.  He did his journal 2 days last week and does mindfulness before bed.  He is trying to find more time for himself and had a heart-to-heart with his 11 year old son about being in treatment and \"why daddy acted different some times.\"  His son gave him a big hug and this was a huge relief to Meagan to be honest with him and have his son appear to understand and not have resentment.         B. Therapist Assessment:   Meagan was more talkative today than last week and thanked the group members and his counselor for giving him ideas of how to approach this delicate subject with his son. " "The last time he had a craving was last Friday as he said he has to pass the liquor stores he used to go to. Even going a different way, he has to pass liquor stores.  He uses distraction to keep from acting on the craving.      V.   Reflection and evaluation of today s group experience:  Gave verbal feedback and filled out evaluation form. Client wrote the most important thing(s) learned today included \"I learned the brain is very easily tricked into a poor state due to substance use\" and \"I want to learn more about Serotonin and Dopamine chemicals and if the brain can recovery from long-term use of substances\" [We discussed that it can recover its homeostasis unless too much damage has occurred which is rare, but the changes in function and structure usually remain intact.]      VI. Assignments or activities to do for next week: Continue to do journal before bed, do at least one mindfulness exercise in the day time, address one self-care area a week, incorporate one stress reduction technique into daily schedule of structure and routine.     Therapeutic techniques in this session:  Motivational Therapy, CBT/DBT/ACT, Supportive, Behavioral Modification and Mindfulness      Additional Treatment Referrals/Follow-up Issues to Address: Not needed at this time.      Change in Treatment Plan: No change. This session completes one Treatment Plan intervention in D3.     Change in Diagnosis: No change in diagnosis indicated.    "

## 2019-01-07 ENCOUNTER — HOSPITAL ENCOUNTER (OUTPATIENT)
Dept: BEHAVIORAL HEALTH | Facility: CLINIC | Age: 54
End: 2019-01-07
Attending: SOCIAL WORKER
Payer: COMMERCIAL

## 2019-01-07 PROCEDURE — H2035 A/D TX PROGRAM, PER HOUR: HCPCS | Mod: HQ

## 2019-01-09 ENCOUNTER — HOSPITAL ENCOUNTER (OUTPATIENT)
Dept: BEHAVIORAL HEALTH | Facility: CLINIC | Age: 54
End: 2019-01-09
Attending: SOCIAL WORKER
Payer: COMMERCIAL

## 2019-01-09 PROCEDURE — H2035 A/D TX PROGRAM, PER HOUR: HCPCS | Mod: HQ

## 2019-01-10 ENCOUNTER — HOSPITAL ENCOUNTER (OUTPATIENT)
Dept: BEHAVIORAL HEALTH | Facility: CLINIC | Age: 54
End: 2019-01-10
Attending: SOCIAL WORKER
Payer: COMMERCIAL

## 2019-01-10 NOTE — PROGRESS NOTES
"  Key to reading dimensions:   -Bolded text in a dimension indicates information about a client at service initiation.  -Underlined portions in a dimension indicate important information that writer is carrying forward from week to week as a reminder.    CD ADULT Progress Note     Treatment Plan Review completed on:  1/3/2018     Attendance Dates: Phase 1 group sessions on 12/31/2018, 01/02/2019, 01/03 and 01/4/2019    Total # of Group Sessions for Phase I: 13  Total # of Group Sessions in Phase II:  N/A  Total # of Group Sessions in Phase III:  N/A  Total # of 1:1 Sessions: 4.       MONDAY TUESDAY WEDNESDAY THURSDAY FRIDAY SATURDAY SUNDAY TOTAL HOURS   Group Therapy 2  2 Excused 3   7   Specialty Groups*            1:1           Family Program           [place]             Phase III             Absent (place \"X\")             Total's   2 Excused 3   7     *Specialty Groups include Mental Health Care, Assertiveness and Communication, Sobriety Maintenance Skills, Spiritual Care, Stress Management, Relapse Prevention, Family Systems.                             Learning Style:    Hands on    Staff member contributing: Ramandeep Chandler MA, [facility]     Received supervision: No.    Client contributed to goals and plan: Yes (explain) - New Plan.    Client received a signed copy of treatment plan/revised plan: Yes    Changes to Treatment Plan: No.    Client agrees with plan/revised plan: Yes    Any changes in Vulnerable Adult Status:  No    1) Care Coordination Activities: Jonathan JOY psychotherapist  2) Medical, Mental Health and other appointments the client attended: SEE DIM's II & III below.  3) Medication issues: No.  4) Physical and mental health problems: SEE DIM's II & III below.  5) Review and evaluation of the individual abuse prevention plan: N/A     Substance Use Disorders:    303.90 (F10.20) Alcohol Use Disorder Severe  305.20 (F12.10) Cannabis Use Disorder Mild  305.60 (F14.10) Cocaine Use Disorder Mild    ASAM Risk " "Ratings and Data     DIMENSION 1: Acute Intoxication/Withdrawal  The client's ability to cope with withdrawal symptoms and current state of intoxication     Acute Intoxication/Withdrawal - Current Risk Factor:  0    Reporting a sober date of: 12/5/2018 for Alcohol.    Goals:  Develop effective strategies to maintain sobriety. Report any substance or alcohol use to both counselor and the group.     Data: Client denied nor demonstrated any signs/symptomology of intoxication and/or withdrawal. Client denied having any symptoms of PAW. Client confirmed his last use date was on 12/5/2018 for cocaine and oxy.       DIMENSION 2:  Biomedical Conditions and Complaints  The degree to which any physical disorder would interfere with treatment for substance abuse and the client's ability to tolerate any related discomfort     Biomedical Conditions and Complaints - Current Risk Factor:  1    Goals: Disclose CD status to medical providers and follow up with medical interventions while in DOP.     Goal: Client will effectively manage his physical health and will follow recommendations of your medical provider.    Data: Client sees Dr. Salomon Green and NP Tammy Cheatham, [facility] diverticulitits, high blood pressure, chronic neck and back pain.      Client rated his overall health on a scale of 1-10 (1=poor, 10=excellent): 7-8. Client reports doing the following for self-care during the past week; \"working on my house and playing games with my son\"  Client reports the following changes to his physical health over the past week; \" client will get a colonoscopy done.\"  Client reports the following upcoming doctor s appointments: Colonoscopy, January 10, 2018\".     DIMENSION 3:  Emotional/Behavioral/Cognitive Conditions and Complications  The degree to which any condition or complications are likely to interfere with treatment for substance abuse or with function in significant life areas and the likelihood of risk of harm " "to self or others.     Emotional/Behavioral - Current Risk Factor:  1    DSM-5 Diagnoses:   Client denies mental health diagnosis, provisional dx of depression and anxiety  Suicide Assessment:   Risk Status    Ideation - Active thoughts of suicide Intent to follow through on suicide Plan for completing suicide    Yes No Yes No Yes No   Emergent  x  x  x   Urgent / Non-Emergent  x  x  x   Non- Urgent  x  x  x   No Current/Active Risk  x  x  x      Goals: To develop affective tools to manage his emotions.  Goal:  Learn and utilize coping skills to manage his irrational believes and struggles with impulsivity in a healthy way.  Goals:  Increase awareness and understanding of the relationship between mental health and substance use.     Data: Client reports feeling the following two emotions today; \"Anxious and happy.\" Client rated the following MH symptoms on a scale of 1-10 (0=did not endorse): sleeplessness- 2, fatigue- 1, difficulty concentrating- 3, mood swings- 3  Due to changes with new schedule, irritability- 3 due to attempting to buy a new used car, sadness- 1, excessive sleep- 1, racing thoughts- 3, work related stressors, hopelessness- 0. Client denies experiencing any other aforementioned symptomology over the past week. Client reports taking the following psychotropic medications; Lisinopril. Client identified his 2 stressors of \"Work and Fatherhood\".  He is addressing his 2 stressors by \"talking more with my wife about positive things\".  Client completed his DA on 1/11/2019 with Alfreda Hickey and his CD counselor.  Client denied suicidal ideation or self-injurious behavior.    DIMENSION 4:  Readiness to Change  Consider the amount of support and encouragement necessary to keep the client involved in treatment.     Readiness to Change - Current Risk Factor:  1    Goals:  Increase awareness with how substance use is conflicted with personal values and address any ambivalence to change.   Goals:  Address " "ambivalence regarding substance use and sobriety.  Goals:  Increase insight into how use has affected you and those around you  Goals:  Increase and/or maintain internal motivation to achieve sobriety from substances.    Data: Client rated their motivation of the week on a scale of 1-10 (1=low, 10= high): 7. Client reports his main motivation for sobriety is \"family and health\". Client reports \" free time, time alone\"  is what blocks motivation for sobriety for him. Client reports using the following coping strategies that are helpful for his continued sobriety: \"some meditation and self care\". Client reports wanting to learn the following coping skills and/or to practice: \"how to forget the bad thoughts.\"  Client is working on \"What Do I believe in.\"       DIMENSION 5:  Relapse/Continued Use/Continued Problem Potential  Consider the degree to which the client recognizes relapse issues and has the skills to prevent relapse of either substance use or mental health problems.     Relapse/Continued Use/Continued Problem Potential - Current Risk Factor:  2    Goals:  Increase awareness of the disease concept of addiction and insight into personal relapse triggers and strategies to address.   Goals:  Identify and address relapse triggers and cues.    Data: Client rated his \"Average Craving Rating\" on a scale of 1-10 (1=low, 10= high): 2.  Client reported \"being along for a  Long periods of time - example over the weekend, went grocery shopping, was alone and had cravings for alcohol.  Client was able to identify that \"Saturday was the day that he used to use, habit.\"  is his trigger and the way he is addressed his trigger was to do breathing exercise which helped minimize his cravings and triggers.  He was also able to avoid using by \"spent a lot of time with my family and in-laws. Client reported the one thing he struggled with this week was answering his father-in-law 20 questions after finding out that client was in " "treatment. He indicated that he worked on managing his time to finish work and get to group.    DIMENSION 6:  Recovery Environment  Consider the degree to which key areas of the client's life are supportive of or antagonistic to treatment participation and recovery.     Recovery Environment - Current Risk Factor: 2    Support group meetings attended this week: 0  Where: N/A  If zero attendance, what s preventing you: Client indicated that he is waiting to see what night would be available to attend a support groups.    Do you currently have a sponsor: Yes    Did you have contact with your sponsor this week? yes    Did family agree to attend family week:  NA    If yes: NA    Goals:  Increase sober support network. Continue to identify and attend sober support group meetings. Goal  Goal:  Implement and/or improve positive daily structure and routine.  Goal:  Build sober social support in community.  Goal:  Learn and practice communication and assertiveness skills.  Goal;  ddress relationship conflicts with family.    Data: Client rated their \"Living Situation\" on a scale of 1-10 (1=very helpful, 10=very stressful): 1. Client reports that he is living with his wife and son and reports that his wife is very helpful.  My wife is supportive.  Client reported that he has a son who has mild signs of Autism. Client reports during the past week, he \"explained to his son and father in law that he is in group and why.\"  As a way to expand his sober support network. Client reports he enjoyed, \"bowling, work on my house, cooking\" for sober fun activities this past week. Client denies having any legal issues.      Employment: Client reports he is employed full-time.  Volunteerism: No.     Intervention: Intervention: Counselor/therapist used Behavioral Therapy, Cognitive Behavioral Therapy, Counselor Feedback, Education, Emotional management, Group Feedback, Motivational Enhancement Therapy, Relapse Prevention, Twelve Step " Facilitation, DBT and REBT.  Client learned and practiced the following skills in group this week: Meditation, breathing exercise, devotional and reflection.    1/7/2019  Client welcomed a new group member into the group process.  Spent majority of the time building therapeutic alliance  1/9/2019:  Group process on client's group time relating to difficulties in family relationships, meditation about dreams.  Client was present for a peer who presented an assignment and was able to process and provide feedback to his peers.     Assessment:    Stages of Change Model: Client is currently in the Stages of Change Model    Client has difficult time making it through group without having to leave to go to the bathroom.  At times, client appeared to be having difficult time staying in his seat and he indicated that is due to his pain and previous surgeries.  Client was very proactive about missing group on 1/10/2019 and reported that he will bring all the necessary Colonoscopy and new medications if given.     Plan:   -Client will spend time getting acclimated to the group.   -Client will follow all recommendations of counselor and any other medical provider which includes taking all medications as prescribed.    -Client will attend all weekly Phase 1 group sessions.   -Client to engage in sober activities each week.      Ramandeep Chandler MA, [facility] [place]

## 2019-01-11 ENCOUNTER — HOSPITAL ENCOUNTER (OUTPATIENT)
Dept: BEHAVIORAL HEALTH | Facility: CLINIC | Age: 54
End: 2019-01-11
Attending: SOCIAL WORKER
Payer: COMMERCIAL

## 2019-01-11 PROCEDURE — H2035 A/D TX PROGRAM, PER HOUR: HCPCS | Mod: HQ

## 2019-01-11 PROCEDURE — H2035 A/D TX PROGRAM, PER HOUR: HCPCS

## 2019-01-11 NOTE — PROGRESS NOTES
Adult Intake Structured Interview  Standard Diagnostic Assessment      CLIENT'S NAME: Iker Stephenson  MRN:   [id number]  :   1965  ACCT. NUMBER: [id number]  DATE OF SERVICE: 19      Identifying Information:  Client is a 53 year old, ,  male. Client was referred for counseling by his own realization that his drinking was out of his control and then asked his employer to assist with obtaining treatment. They had offered to help him with leave and/or short-term disability.  rk and .  Client is currently employed full time. Client attended the session alone.       Client's Statement of Presenting Concern:  Client reports the reason for seeking therapy at this time as because he was feeling sick and missing work. He also said at evaluation that he was making poor decisions.  Client stated that his symptoms have resulted in the following functional impairments: childcare / parenting, chronic disease management, health maintenance, home life with wife and son , management of the household and or completion of tasks, money management, self-care, social interactions and work / vocational responsibilities      History of Presenting Concern:  Client reports that these problem(s) began around  when client began drinking and using cocaine once a week on the weekend. Client has attempted to resolve these concerns in the past through going to [facility] treatment 20-25 years ago as well as trying to cut down to one drink a day but this didn't work for him. He also had diverticulitis that was made worse by his alcohol use. Client reports that other professional(s) are involved in providing support / services. He is currently in [facility] IOP program and his primary counselor is Ramandeep Chandler. Ramandeep referred Iker for this assessment, this therapist is part of the same treatment program, [facility].       Social History:  Client reported he grew up in [place], [place] . He was the  "second born of three children. This was an intact family and his parents were  for 42 years until his mother  in  from heart attack. . Client reported his childhood as \"I had a good, loving family. Childhood was great.\"  Client described his current relationships with family of origin as \"getting along with his father and he loves his father and gets along well with his siblings.\"    Client reported a history of 2 committed relationships or marriages. Client has been  for 17 years. Client reported having one 11 year-old son. Client identified 7-8 stable and meaningful social connections. Client reported that he has not been involved with the legal system. Client's highest education level was some college. Client did not identify any learning problems. There are no ethnic, cultural or Amish factors that may be relevant for therapy. Client identified his preferred language to be English. Client reported he does not need the assistance of an  or other support involved in therapy. Modifications will not be used to assist communication in therapy. Client  did not serve in the .     Client reports family history is not on file.    Mental Health History:  Client reported no family history of mental health issues.  Client has not been previously diagnosed with a mental health diagnosis.  Client has not received mental health services in the past.  Hospitalizations: None.  Client is not currently receiving any mental health services, except for current [facility] treatment.      Chemical Health History:  Client reported no family history of chemical health issues. Client has received chemical dependency treatment in the past at [facility] about 25 years ago. . Client is currently receiving the following services: CD Treatment at [facility], [facility]. . Client reported the following problems as a result of drinking and drug use: family problems, financial " "problems and relationship problems.      Client Reports:  Client reports using alcohol \"about 7-8 beers once a week and a shot or two of peppermint schnapps with that\" on one of the weekend days.Client first started drinking at age 17 years old.   Client denies using tobacco.  Client reports using marijuana seldom in  and   when he was in college, he was 19 or 20 years old.    Client reports using caffeine 2 times a day, coffee in the morning and a soda in the late afternoon. His first caffeinated drink was at 20 years.   Client reports using the street drug(s) of cocaine in  at age 25 years old. He used at most once a week.   Client reports the non-medical use of prescription or over the counter drugs when he was given percocet or oxycodone.      CAGE: C     Patient felt they ought to CUT down on your drinking (or drug use).  A     Patient felt ANNOYED by people criticizing their drinking (or drug use).  G     Patient felt bad or GUILTY about their drinking (or drug use).   Based on the positive Cage-Aid score and clinical interview there  are indications of drug or alcohol abuse. CD assessment indicated Alcohol use disorder, severe (F10.20) and Opiate use disorder, mild (F11.1).   Iker is currently in [facility] treatment program.      Significant Losses / Trauma / Abuse / Neglect Issues:  There are indications or report of significant loss, trauma, abuse or neglect issues related to: Iker reported that 12 years ago his wife and he preplanned to have a child, both were 42 years old and she miscarried at 14 weeks when out to dinner and had to be rushed to the hospital and given blood transfusions. This occurred on her birthday.  His mother passed in  and he was very close to her.  \"She was my biggest fan.\"  He feels she is still with him or \"visits\" sometimes.  He was born a week after his mother's cousin, Ricky Dong,  and they named him Iker García and one family member believes he is truly " connected to this  second cousin.      Issues of possible neglect are not present.      Medical Issues:  Client has had a physical exam in the last year and he is here for mild depression and anxiety and he is going through PAWS since being in recovery. Date of last physical exam was . His physician is Salomon Green, PCP. The client does have a Primary Care Provider with [facility].  The client reports not having a psychiatrist. Client reports the following current medical concerns: He has a history of 4 fusions in his neck and left thumb is fused and his removed gall bladder, he had a microdesectiomy of some disks in lower back, diverticulitis 2018. . The client denies the presence of chronic or episodic paind in neck, spine and left thumb.  There are not significant nutritional concerns.     Client reports current meds as:   Current Outpatient Medications   Medication Sig     amitriptyline (ELAVIL) 10 MG tablet Take 10 mg by mouth At Bedtime     dicyclomine (BENTYL) 10 MG capsule Take 10 mg by mouth 4 times daily (before meals and nightly)     lisinopril (PRINIVIL,ZESTRIL) 10 MG tablet Take 1 tablet by mouth daily.     tiZANidine (ZANAFLEX) 2 MG capsule Take 2 mg by mouth 3 times daily     No current facility-administered medications for this encounter.    Corrections from the chart above, Iker reports taking ONLY lisiniopril and baby aspirin. No Ibuporfen or tylenol.     Client Allergies:  No Known Allergies  no known allergies to medications    Medical History:  No past medical history on file.      Medication Adherence:  Client reports taking prescribed medications as prescribed.    Client was provided recommendation to follow-up with prescribing physician.    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  Client denies current or recent suicidal ideation or behaviors.  Client denies current or recent homicidal ideation or behaviors.  Client denies current or recent self injurious behavior or ideation.  Client denies other safety concerns.  Client reports there are no firearms in the house.  A safety and risk management plan has been developed including: Iker completed a safety plan with Ramandeep Chandler at the beginning of this [facility] treatment.       Patient's Strengths and Limitations:  Client identified the following strengths or resources that will help him succeed in counseling: Moravian, commitment to health and well being, juan m / spirituality, friends / good social support, family support, intelligence, positive work environment, sense of humor and perseverance . Client identified the following supports: community involvement, family, Denominational / spirituality, friends and [facility] and AA meetings. . Things that may interfere with the clients success in counseling include:Nothing except " he would like to work through the loss of his mother and unborn child. Iker would like to make up to his wife for being dishonest when using alcohol and cocaine.       Diagnostic Criteria:  F41.8 (300.09): Other Specified Anxiety Disorder; generalized anxiety disorder, not occurring more days than not.   See diagnostic impressions below.     Functional Status:  Client's symptoms have caused reduced functional status in the following areas:   Work      DSM5 Diagnoses: (Sustained by DSM5 Criteria Listed Above)  Diagnoses: 300.09 (F41.8) Other Specified Anxiety Disorder   Psychosocial and Contextual Factors: See diagnostic impressions below.   Diagnostic Impressions:  Notes from the interview:   I have some depression  and he gets too little sleep. When he started Tx he was waking during the night every 2 hours. Today he explained this was getting much better as he is drinking no liquids after 8:00 PM and not eating as much at his evening meal. He is also further along in his treatment and this often improves sleep duration. Another reason may be that he had not been working for a few weeks and he just returned to work 2 weeks ago.   He said last Friday he woke up at 1 AM and then slept until 7 AM which was a significant improvement, he doesn t remember the last time he slept 6 hours in a night.  He also slept well last night, but he had had a colonoscopy yesterday, no complications.      He endorsed the PAWS of irritability, but his overall focus appeared to be work-related stress that has a lot to do with him having to be on-call several days a week.  One of his goals is to be taken off the  on-call  list permanently. At this point, his employer covered his on-call hours through the end of March, so this also may be giving him more restful sleep.  He said his employer told him they are planning to hire more people in the spring and he may be able to replace him on the on-call schedule.      Iker also stated he  feels better since treatment and  I found myself happier the last few weeks, but I am still irritable.    He also endorsed feeling guilty:  I still feel guilty that I lied to my wife about my usage and I was spending too much money.  He said he also lied to his mother about his use and he regrets this as well.  This does not appear to be complicated grief.   For Diagnosis:  For MDD:    Must have 5 of 9 symptom groups in Category A to have a diagnosis of MDD.   1. He denied  2. He denied any form of anhedonia.  3. He denied any weight loss or weight gain recently  4.  Insomnia or hypersomnia early every day :  He endorsed having difficulty getting 8 hours of uninterrupted sleep per night, but that this has improved recently.   He explained,  I have some depression  and he has difficulty sleeping through the night.  When he started Tx he was waking during the night every 2 hours, stating this was a pattern most of his adult life. Recently this has improved. He has been proactive and is now drinking no liquids after 8:00 PM and not eating as much at his evening meal. He is also further along in his treatment and this may also be a reason as recovery often improves sleep duration. Another reason may be that he had not been working for a few weeks, has not been on-call which interrupts his sleep cycle.  He returned to work 2 weeks ago but will not be on-call until the end of March.   He said last Friday he woke up at 1 AM and then slept until 7AM which was a significant improvement, he doesn t remember the last time he slept 6 hours in a night.  He also slept well last night, 1/10/19, but he also underwent a colonoscopy yesterday.      He endorsed the post-acute withdrawal symptom of irritability, but his overall focus appeared to be work- related stress that he feels stems from the demands of his on-call schedule several days a week.  One of his goals is to be taken off the  on-call  list permanently. At this point, his  employer covered his on-call hours through the end of March, so this also may be giving him more restful sleep.  He said his employer told him they are planning to hire more people in the spring and he may be able to replace on the on-call schedule.    5. He denied psychomotor agitation or retardation  6. He denied fatigue or loss of energy   7. He endorsed feeling guilty:   I lied to my wife about my usage and I was spending too much money.   He said he also lied to his mother about his use and he regrets this as well, but this is not excessive and does not occur  nearly every day.   He said his  feelings of worthlessness  has decreased significantly since starting treatment and are not an issue currently.    8. He denied diminished ability to concentrate, etc.  9. He denied any form of suicidality or thoughts of self-harm to self or others.     B. He denied that any of these symptoms caused clinically significant distress or impairment in any area.  C. These symptoms are not directly attributable to the physiological effects of a substance or to another medical condition.  NOTE:  Iker is in recovery from alcohol and cocaine use and some of these symptoms may be part of the post-acute withdrawal stage of recovery. He has not yet had 6 months of abstinence.   Summary: He endorsed #4 and #7 under A, he does not fit the criteria for MDD or other forms of depression.      For SERJIO:   A. He denied excessive anxiety and worry  When not working,  but this is present when working in his current job.  B. 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    C.  Three of 6 must be endorsed: He endorsed 1, 4, 6.   1. He endorsed feeling keyed up or on edge  at work ; not  more days than not for at least 6 months   2. He denied  3. He denied  4. He endorsed Irritability (also a PAWS); not  more days than not for at least 6 months   5. He denied  6. He  endorsed sleep disturbance.  This has occurred for more days than not for at least 6 months,  however, this is currently improving since [facility] treatment.   ROCKY Dong states his main issue is feeling stressed at work, he is not impaired in any other category   E. It is unclear if his disturbance is attributable to the physiological effects of post-acute withdrawal symptoms. He was not fidgety or restless during the interview.   MADELEINE Dong is in treatment for substance use disorder of alcohol and cocaine and during the PAW period, the person s distress tolerance may decrease significantly, therefore it is unclear at this time if these symptoms may resolve with further recovery. It is unclear how much his distress tolerance is being affected by post-acute withdrawal period from alcohol and cocaine.      Comments: He endorses stress and said,  My stress is a 12 on a scale from 1 to 10!  Iker said this centers on going back to work in his current position. He does not endorse stress symptoms outside of work.  He is connecting this to his work and his on-call schedule. He is currently exploring changing positions and going back to being an  with his current company, as he has worked there for 12 years. 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  He is being proactive and his counselor, Ramandeep Chandler, is helping him problem solve in this area.      Summary:  Iker fits criteria for:  F41.8 (300.09): Other Specified Anxiety Disorder; generalized anxiety disorder, not occurring more days than not.     WHODAS 2.0 (12 Item)            This questionnaire asks about difficulties due to health conditions. Health conditions  include disease or illnesses, other health problems that may be short or long lasting,  injuries, mental health or emotional problems, and problems with alcohol or drugs.                      Think back over the past 30 days and answer these questions, thinking  about how much  difficulty you had doing the following activities. For each question, please Tanana only  one response.    S1 Standing for long periods such as 30 minutes? Severe =       4   S2 Taking care of household responsibilities? None =         1   S3 Learning a new task, for example, learning how to get to a new place? None =         1   S4 How much of a problem do you have joining community activities (for example, festivals, Holiness or other activities) in the same way as anyone else can? None =         1   S5 How much have you been emotionally affected by your health problems? Moderate =   3     In the past 30 days, how much difficulty did you have in:   S6 Concentrating on doing something for ten minutes? None =         1   S7 Walking a long distance such as a kilometer (or equivalent)? None =         1   S8 Washing your whole body? None =         1   S9 Getting dressed? None =         1   S10 Dealing with people you do not know? None =         1   S11 Maintaining a friendship? None =         1   S12 Your day to day work? Moderate =   3     H1 Overall, in the past 30 days, how many days were these difficulties present? Record number of days 2   H2 In the past 30 days, for how many days were you totally unable to carry out your usual activities or work because of any health condition? Record number of days  0   H3 In the past 30 days, not counting the days that you were totally unable, for how many days did you cut back or reduce your usual activities or work because of any health condition? Record number of days 2         Preliminary Treatment Plan:  The client reports no currently identified Holiness, ethnic or cultural issues relevant to therapy.     services are not indicated.    Modifications to assist communication are not indicated.    The concerns identified by the client will be addressed in therapy.    Initial Treatment will focus on: Anxiety - stress related symtpoms concerning  his work & grief issues.    As a preliminary treatment goal, client Take proactive steps to reduce his stressful work schedule .    The focus of initial interventions will be to teach distress tolerance skills, teach problem-solving skills, teach relaxation strategies and teach stress mangement techniques.    The client is receiving treatment / structured support from the following professional(s) / service and treatment. Collaboration will be initiated with: his [facility] counselor, Ramandeep Chandler, who is also in process of becoming psychotherapist. .    Referral to another professional/service is not indicated at this time..    A Release of Information is not needed at this time.    Report to child / adult protection services was NA.    Client will have access to their [facility]' medical record.    Additional Screening Instruments:   Client also completed the screening tool SERJIO - 7 with a score of 4 which indicates mild anxiety and this was explored more thoroughly thorough this DA    Client also completed the screening tool PHQ-9 with a score of 4 which indicates minimal depression, and this was explored more thoroughly through this DA        Alfreda Hickey, Northern Light Mayo HospitalSW  January 11, 2019

## 2019-01-11 NOTE — PROGRESS NOTES
Iker met with JASON Veras, [facility], from 3:30 PM - 5:10 PM, and we completed a Diagnostic Assessment. Please see document for details.

## 2019-01-12 NOTE — PROGRESS NOTES
"Iker Stephenson  January 11, 2019  [id number]    [facility] Mental Health Process Group Note      Day and Date:  Friday, 01/11/19     Number of participants:  5      Length of Group: 3 hours      Goal of the Group: Learn mindfulness exercises to help clients focus on the present and increase their awareness of thoughts and emotions.  This process helps clients detach from unhelpful thinking patterns and be less affected by negative emotions. This practice has been shown to decrease reactivity and help facilitate effective action to work on changes in the present and thereby create a brighter, healthier future. This practice has also been shown to increase resiliency if practiced regularly.       [place]: Group Topics and Activities      I.  D3 Intervention and Group Topic addressed: Mindfulness to facilitate effective action     II. Mindfulness and/or Motivational Component: Mindful Eating, Mindful Listening, Awareness of Sounds Meditation, Sitting Meditation      III. Additional Activity:  Mindful Eating, Mindful Listening, [place] exercise       IV. Review of Progress:   A. Client self-report:  Iker reported having \"a pretty good week\" and his colonoscopy went well yesterday morning.  He continues to do his journal entries 2-3 times a week and learned several new mindfulness techniques today.  His stress is at a 4 which is mainly due to being back at a very stressful work schedule. He is working on being able to drop being \"on-call\" so he start to sleep better. Earlier he completed a DA with me and he said that he is starting to sleep much better since he has taken time off and not been on-call and he is sleeping some nights 6-7 hours before waking. He used to wake every 2 hours. He has stopped drinking liquids after 8 PM and he is eating lighter dinners and hasn't been on-call for a few weeks because of this treatment.  He has had some cravings during the mid-day, from boredom when he was home but being back to work " "last week has helped.    Mindful Eating: Iker noticed the \"structure of the pineapple that I never noticed before\" and \"I felt energized when I ate it, like the flavor energized me.\"    Mindful Listening: Being less distracted was helpful in listening. He also said he found that he and his listening partner had a lot in common that they never would have known about.   B. Therapist Assessment: Iker is applying what he is learning and participates fully in the group process.    The [employer] exercise issue for mindfulness practice centered on \"Upset that my employer didn't give me my full Xmas bonus due to my leave.\" The comments before the mindfulness included \"Angry, stressful, no loyalty, confused, violated, been with company for 12 years\" The comments after exercises included \"resentment, forgiveness, be resilient, move forward, accountability for putting myself in this position that I did miss a lot of Mondays.\"      V.   Reflection and evaluation of today s group experience:  Gave verbal feedback and filled out evaluation form. Client wrote that the most important things learned were  My quiet calm thoughts can and will change the way I think and process problems and 'bad' thoughts\" and \"I know now I can stop racing thoughts\" and he wrote he was surprised \"I can calm down.\"      VI. Assignments or activities to do for next week: Mindfulness in the day and journal before bed. Continue to monitor stress daily and use skills to manage.      Therapeutic techniques in this session:  Motivational Therapy, CBT/DBT/ACT, Supportive, Behavioral Modification and Mindfulness     Additional Treatment Referrals/Follow-up Issues to Address: Not indicated at this time      Change in Treatment Plan: No changes are indicated at this time. This group does complete one Treatment Plan intervention under D3.       Change in Diagnosis: No Changes at this time.           "

## 2019-01-15 ENCOUNTER — HOSPITAL ENCOUNTER (OUTPATIENT)
Dept: BEHAVIORAL HEALTH | Facility: CLINIC | Age: 54
End: 2019-01-15
Attending: SOCIAL WORKER
Payer: COMMERCIAL

## 2019-01-15 PROCEDURE — H2035 A/D TX PROGRAM, PER HOUR: HCPCS | Mod: HQ

## 2019-01-16 ENCOUNTER — HOSPITAL ENCOUNTER (OUTPATIENT)
Dept: BEHAVIORAL HEALTH | Facility: CLINIC | Age: 54
End: 2019-01-16
Attending: SOCIAL WORKER
Payer: COMMERCIAL

## 2019-01-16 PROCEDURE — H2035 A/D TX PROGRAM, PER HOUR: HCPCS

## 2019-01-16 PROCEDURE — H2035 A/D TX PROGRAM, PER HOUR: HCPCS | Mod: HQ

## 2019-01-16 ASSESSMENT — ANXIETY QUESTIONNAIRES
4. TROUBLE RELAXING: NOT AT ALL
3. WORRYING TOO MUCH ABOUT DIFFERENT THINGS: NOT AT ALL
1. FEELING NERVOUS, ANXIOUS, OR ON EDGE: SEVERAL DAYS
IF YOU CHECKED OFF ANY PROBLEMS ON THIS QUESTIONNAIRE, HOW DIFFICULT HAVE THESE PROBLEMS MADE IT FOR YOU TO DO YOUR WORK, TAKE CARE OF THINGS AT HOME, OR GET ALONG WITH OTHER PEOPLE: NOT DIFFICULT AT ALL
GAD7 TOTAL SCORE: 2
2. NOT BEING ABLE TO STOP OR CONTROL WORRYING: NOT AT ALL
5. BEING SO RESTLESS THAT IT IS HARD TO SIT STILL: NOT AT ALL
6. BECOMING EASILY ANNOYED OR IRRITABLE: SEVERAL DAYS
7. FEELING AFRAID AS IF SOMETHING AWFUL MIGHT HAPPEN: NOT AT ALL

## 2019-01-16 ASSESSMENT — PATIENT HEALTH QUESTIONNAIRE - PHQ9: SUM OF ALL RESPONSES TO PHQ QUESTIONS 1-9: 2

## 2019-01-16 NOTE — PROGRESS NOTES
"  Key to reading dimensions:   -Bolded text in a dimension indicates information about a client at service initiation.  -Underlined portions in a dimension indicate important information that writer is carrying forward from week to week as a reminder.    CD ADULT Progress Note     Treatment Plan Review completed on:  1/17/2018     Attendance Dates: Phase 1 group sessions on 1/15/2019. 1/16/2019 and 1/17/2019    Total # of Group Sessions for Phase I: 17  Total # of Group Sessions in Phase II:  N/A  Total # of Group Sessions in Phase III:  N/A  Total # of 1:1 Sessions: 5.       MONDAY TUESDAY WEDNESDAY THURSDAY FRIDAY SATURDAY SUNDAY TOTAL HOURS   Group Therapy   Excused 2 3 2    7   Specialty Groups*            1:1   1        Family Program           [place]             Phase III             Absent (place \"X\")             Total's  2 4 2 3   7     *Specialty Groups include Mental Health Care, Assertiveness and Communication, Sobriety Maintenance Skills, Spiritual Care, Stress Management, Relapse Prevention, Family Systems.                             Learning Style:    Hands on    Staff member contributing: Ramandeep Chandler MA, [facility]     Received supervision: Yes, JASON Pendleton, [facility]    Client contributed to goals and plan: Yes (explain) - recent use episoids    Client received a signed copy of treatment plan/revised plan: Yes    Changes to Treatment Plan: added additional Relapse triggers and cue assignments    Client agrees with plan/revised plan: Yes    Any changes in Vulnerable Adult Status:  No    1) Care Coordination Activities: Jonathan JOY psychotherapist  2) Medical, Mental Health and other appointments the client attended: SEE DIM's II & III below.  3) Medication issues: No.  4) Physical and mental health problems: SEE DIM's II & III below.  5) Review and evaluation of the individual abuse prevention plan: N/A     Substance Use Disorders:    303.90 (F10.20) Alcohol Use Disorder Severe  305.20 (F12.10) " "Cannabis Use Disorder Mild  305.60 (F14.10) Cocaine Use Disorder Mild    ASAM Risk Ratings and Data     DIMENSION 1: Acute Intoxication/Withdrawal  The client's ability to cope with withdrawal symptoms and current state of intoxication     Acute Intoxication/Withdrawal - Current Risk Factor:  0    Reporting a sober date of: 12/5/2018 for Alcohol, cocaine use 1/12/2019.    Goals:  Develop effective strategies to maintain sobriety. Report any substance or alcohol use to both counselor and the group.     Data: Client denied nor demonstrated any signs/symptomology of intoxication and/or withdrawal. Client denied having any symptoms of PAW. Client admitted to last use date with alcohol to be 12/5/2019 and admitted to recent \"use\" episodes on 1/12/2019 with cocaine.   1/17/2019:  Client provided a positive UA for cocaine.    DIMENSION 2:  Biomedical Conditions and Complaints  The degree to which any physical disorder would interfere with treatment for substance abuse and the client's ability to tolerate any related discomfort     Biomedical Conditions and Complaints - Current Risk Factor:  1    Goals: Disclose CD status to medical providers and follow up with medical interventions while in DOP.     Goal: Client will effectively manage his physical health and will follow recommendations of your medical provider.    Data: Client sees Dr. Salomon Green and NP Tammy Cheatham, [facility] diverticulitits, high blood pressure, chronic neck and back pain.      Client rated his overall health on a scale of 1-10 (1=poor, 10=excellent): 8. Client reports doing the following for self-care during the past week; \"mindfulness and breathing.\"  Client reports the following changes to his physical health over the past week; \" completed colonoscopy exam on 1/10/2019\" and was given FentaNYL during his check up.  No additional pain medications were prescribed.  Client indicated that he received a clean bill of health and no issues as " "a result of his colonoscopy exam.  Client denies having any upcoming doctor s appointments.  Client has access to his medical facility.    DIMENSION 3:  Emotional/Behavioral/Cognitive Conditions and Complications  The degree to which any condition or complications are likely to interfere with treatment for substance abuse or with function in significant life areas and the likelihood of risk of harm to self or others.     Emotional/Behavioral - Current Risk Factor:  1    DSM-5 Diagnoses:   F41.8 (300.09): Other Specified Anxiety Disorder; generalized anxiety disorder, not occurring more days than not.   Suicide Assessment:   Risk Status    Ideation - Active thoughts of suicide Intent to follow through on suicide Plan for completing suicide    Yes No Yes No Yes No   Emergent  x  x  x   Urgent / Non-Emergent  x  x  x   Non- Urgent  x  x  x   No Current/Active Risk  x  x  x      Goals: To develop affective tools to manage his emotions.  Goal:  Learn and utilize coping skills to manage his irrational believes and struggles with impulsivity in a healthy way.  Goals:  Increase awareness and understanding of the relationship between mental health and substance use.     Data: Client reports feeling the following two emotions today; \"Anxious and happy.\" Client rated the following MH symptoms on a scale of 1-10 (0=did not endorse): sleeplessness- 2, fatigue- 2, difficulty concentrating- 2, mood swings- 2 , irritability- 2, sadness- 1, excessive sleep- 1, racing thoughts- 2, work related stressors, hopelessness- 0. Client denies experiencing any other aforementioned symptomology over the past week. Client reports taking the following psychotropic medications; Lisinopril. Client identified his 2 stressors of \"Work and daily bombardment of \"how you doing\".  He is addressing his 2 stressors by \"rest, time for me and music\".  Client met with Alfreda CASTAÑEDA and was given a diagnosis of Other Specified Anxiety Disorder; generalized anxiety " "disorder, not occurring more days than not on 1/11/2019. Client reported a willingness to see Alfreda CASTAÑEDA on a bi-weekly basis.   Client denied suicidal ideation or self-injurious behavior.   Client's current SERJIO 7 is 3 indicating indicating minimal depression and PHQ-9 scored 3, indicating minimal anxiety.    I: Counselor addressed client's \"use\" episodes and assessed client for SI/HI.  Counselor updated client's treatment plan and assessed for client's depression and anxiety. Encouraged client into seek activities that would stimulate him emtionally, physically or mentally to address his boredom.     A:  Client appeared to have been remorseful and admitted that it surprised him on how fast his cravings came and he felt bad after his \"use\".  Client reported that in the past, he would have called up his dealer looking for more, but he did not this time.      P  Client will write down his \"Preventiave Plan, what coping skills he will apply, how motivated is he for recovery (increase his internal motivation) and how he is going to change his thought process to assist him in applying his REBT technique.     DIMENSION 4:  Readiness to Change  Consider the amount of support and encouragement necessary to keep the client involved in treatment.     Readiness to Change - Current Risk Factor:  1    Goals:  Increase awareness with how substance use is conflicted with personal values and address any ambivalence to change.   Goals:  Address ambivalence regarding substance use and sobriety.  Goals:  Increase insight into how use has affected you and those around you  Goals:  Increase and/or maintain internal motivation to achieve sobriety from substances.    Data: Client rated their motivation of the week on a scale of 1-10 (1=low, 10= high): 7. Client reports his main motivation for sobriety is \"family\". Client reports \" free time and stress\"  is what blocks motivation for sobriety for him. Client reports using the following coping " "strategies that are helpful for his continued sobriety: \"breathing strategy\". Client reports wanting to learn the following coping skills and/or to practice: \"calming - thoughts.\"  Client completed his 10 Harmful [place]sequences assignment.\"  Stages of Change Model: Client is currently in the Contemplation Stages of Change Model     I:  1/15/2019:  Client welcomed a new peer and counselor into the group process. Client admitted to using cocaine and was able to process his recent use with his peers.  Client was able to process his  and received encouraging words to continue on with his sobriety.  Client was educated and provided information on how their use of alcohol and/or drugs had harmed others in their life.  This was given for the client to start taking responsibility for their behavior or actions without excuse making or blaming others for their use.     1/17/2019: Client was able to process and welcome his new counselor in the group setting.  Client shared 2 examples of how his using has hurt others.       A:  Client appeared to be in shock about his recent use and was feeling bad and wanted to know if his counselor was disappointed in him.        P:  Client will continue to do his weekly personal assignments.        DIMENSION 5:  Relapse/Continued Use/Continued Problem Potential  Consider the degree to which the client recognizes relapse issues and has the skills to prevent relapse of either substance use or mental health problems.     Relapse/Continued Use/Continued Problem Potential - Current Risk Factor:  2    Goals:  Increase awareness of the disease concept of addiction and insight into personal relapse triggers and strategies to address.   Goals:  Identify and address relapse triggers and cues.    Data: Client rated his \"Average Craving Rating\" on a scale of 1-10 (1=low, 10= high): 5.  Client reported \"that he was working on his truck when he came across some old cocaine that was overlooked.  Client " "indicated that he was surprised to find it and he licked the cocaine and through away the paper.  Client admitted that in the past, he would have sought out his drug dealer to get more.  This time, client indicated that he did not like it and was remorseful after he did it.  Client was able to identify that \"working in his vehicle in the garage was his regular place that he would use \"habit after he found cocaine in his garage.\"  He indicated that he was honest with his wife and informed her of his use and that helped minimize his cravings and triggers.  He indicated that he worked on managing his time to finish work and get to group.    I:  It was recommended that client admit to his \"use\" and process this with his peers in group.  Client was encouraged to learn from his episodes and apply his REBT technique so that this does not happen.  Client was educated on relapse triggers and cues. At the end of the week, client had to be redirected and asked him to change his demeanor as he appeared to be hostile, belligerent and argumentative.     A: Client indicated that he totally cleaned out his garage and that this will never had again.  Client appeared to be very belligerent when he found out that he had a positive UA for cocaine.  Client was argumentative and wanted to fight in front of group.       P:  Attend 2-3 sober support group meetings each week (this includes non-12-step/AA alternative meetings).  -Client will continue to work on treatment plan objectives.  -Client will attend all weekly Phase 1 group sessions.         DIMENSION 6:  Recovery Environment  Consider the degree to which key areas of the client's life are supportive of or antagonistic to treatment participation and recovery.     Recovery Environment - Current Risk Factor: 2    Support group meetings attended this week: 0  Where: N/A  If zero attendance, what s preventing you: Client indicated that it was due to treatment schedule change    Do you " "currently have a sponsor: Yes    Did you have contact with your sponsor this week? yes    Did family agree to attend family week:  yes    If yes: 01/28/2019 with wife and son    Goals:  Increase sober support network. Continue to identify and attend sober support group meetings. Goal  Goal:  Implement and/or improve positive daily structure and routine.  Goal:  Build sober social support in community.  Goal:  Learn and practice communication and assertiveness skills.  Goal;  ddress relationship conflicts with family.    Data: Client rated their \"Living Situation\" on a scale of 1-10 (1=very helpful, 10=very stressful): 1. Client reports that he is living with his wife and son and reports that his wife is very helpful.  My wife is supportive.  Client reported that he has a son who has mild signs of Autism. Client reports during the past week, he \"informed another neighbor about treatment\"  as a way to expand his sober support network. Client reports that his communication has improved with his wife and children.  Client indicated that he is working less and therefore feels less stressful. Client denies having any legal issues.      Employment: Client reports he is employed full-time.  Volunteerism: No.     Intervention: Intervention: Counselor/therapist used Behavioral Therapy, Cognitive Behavioral Therapy, Counselor Feedback, Education, Emotional management, Group Feedback, Motivational Enhancement Therapy, Relapse Prevention, Twelve Step Facilitation, DBT and REBT.  Client learned and practiced the following skills in group this week: Meditation, breathing exercise, devotional and reflection.      Ramandeep Chandler MA, [facility]  "

## 2019-01-17 ENCOUNTER — HOSPITAL ENCOUNTER (OUTPATIENT)
Dept: BEHAVIORAL HEALTH | Facility: CLINIC | Age: 54
End: 2019-01-17
Attending: SOCIAL WORKER
Payer: COMMERCIAL

## 2019-01-17 LAB — COCAINE UR QL: NEGATIVE

## 2019-01-17 PROCEDURE — H2035 A/D TX PROGRAM, PER HOUR: HCPCS | Mod: HQ

## 2019-01-17 PROCEDURE — 80307 DRUG TEST PRSMV CHEM ANLYZR: CPT | Performed by: FAMILY MEDICINE

## 2019-01-17 PROCEDURE — 80353 DRUG SCREENING COCAINE: CPT | Performed by: FAMILY MEDICINE

## 2019-01-17 ASSESSMENT — ANXIETY QUESTIONNAIRES: GAD7 TOTAL SCORE: 2

## 2019-01-17 NOTE — PROGRESS NOTES
"Iker Stephenson  January 16, 2019  [id number]    [facility] Mental Health Process Group Note      Day and Date:  Wednesday, 1/16/19     Number of participants:  6     Length of Group:  3 hours    Goal of the Group: Clients learn key concepts of traditional CBT (cognitive distortions, conditioning, automatic thoughts, reframing) and then build on these by learning three core concepts of third wave therapies (ACT, DBT, MB): Acceptance, Cognitive Defusion and Being Present. The objective is to increase psychological flexibility and choose behaviors that serve the clients  personal values.      Rating scales are 1-10, with 1 being low and 10 being high or most severe.     [place]: Group Topics and Activities     I.  D3 Intervention and Group Topic addressed: Part 1:  3 Core processes to increase psychological flexibility and increase resiliency for mental and physical health.     II. Mindfulness and/or Motivational Component: Getting Comfortable with Moods, Exploring Affirmations to use this week, Liquid Mood meditation, Sitting Meditation      III. Additional Activity:  Three Simple Steps to Acceptance; Using Affirmations to increase Self-Acceptance; Cognitive Defusion exercises     IV. Review of Progress:   A. Client's self-report: Iker shared that he had a use episode on Sat, 1/12/19, and explained, \"It was an accident, I found it in my garage and I did it.\" He had found some cocaine that he had hidden previously, not very much, and it didn't affect him much. His sober changed from 12/05/18 to 1/13/19.  He is doing his journal and mindfulness. His stress is a 3 and he has had no cravings since the 1/12 use.    Comments on Liquid Mood:  \"I saw the liquid but it was clear, but it was so relaxing I'm no uptight anymore.\"     B. Therapist's Assessment: Iker continues to participate fully in the discussions. He said he would like to start seeing me for therapy on a bi-weekly basis as his counselor is moving to the day shift. " "We made appts for 4 dates.    Favorite self-acceptance affirmation was  It may be worse to fail, but failure does not make me a worse person.\"      V.   Reflection and evaluation of today s group experience:  Gave verbal feedback during group and completed the evaluation. Comments included: The most important things learned today were \"To accept negative thoughts and let them transcend through the body, release\" and \"Receive [acknowledge] bad [negative] thoughts before I react to them\" and \"I am vulnerable to negative thoughts.\"      VI. Assignments or activities to do for next week: Complete the journal and practice mindfulness daily. Try to use cognitive defusion throughout the week on negative thoughts and emotions.    Therapeutic techniques in this session:  Motivational Therapy, CBT/DBT/ACT, Supportive and Mindfulness    Additional Treatment Referrals/Follow-up Issues to Address: Not indicated at this time.    Change in Treatment Plan: Not indicated at this time. This group completes one of the Tx Plan interventions under D3.      Change in Diagnosis: No changes at this time.    "

## 2019-01-17 NOTE — PROGRESS NOTES
Talked about his recent use date. He stated that he is not planning on informing his employer that he had a recent episoids.  Employer indicated that they are not going to do daily UA as they informed him they were going on and that due to client being happy, good demeanor and appears to be doing everything that he is supposed to be doing.

## 2019-01-20 LAB
BZE SERPL-MCNC: 206 NG/ML
COCAINE UR-MCNC: NEGATIVE NG/ML

## 2019-01-21 ENCOUNTER — HOSPITAL ENCOUNTER (OUTPATIENT)
Dept: BEHAVIORAL HEALTH | Facility: CLINIC | Age: 54
End: 2019-01-21
Attending: SOCIAL WORKER
Payer: COMMERCIAL

## 2019-01-21 PROCEDURE — H2035 A/D TX PROGRAM, PER HOUR: HCPCS | Mod: HQ

## 2019-01-21 NOTE — ADDENDUM NOTE
Encounter addended by: Ramandeep Chandler [facility] on: 1/21/2019 3:39 PM   Actions taken: Sign clinical note

## 2019-01-22 ENCOUNTER — HOSPITAL ENCOUNTER (OUTPATIENT)
Dept: BEHAVIORAL HEALTH | Facility: CLINIC | Age: 54
End: 2019-01-22
Attending: SOCIAL WORKER
Payer: COMMERCIAL

## 2019-01-22 PROCEDURE — H2035 A/D TX PROGRAM, PER HOUR: HCPCS | Mod: HQ

## 2019-01-23 ENCOUNTER — HOSPITAL ENCOUNTER (OUTPATIENT)
Dept: BEHAVIORAL HEALTH | Facility: CLINIC | Age: 54
End: 2019-01-23
Attending: SOCIAL WORKER
Payer: COMMERCIAL

## 2019-01-23 PROCEDURE — H2035 A/D TX PROGRAM, PER HOUR: HCPCS | Mod: HQ

## 2019-01-24 ENCOUNTER — HOSPITAL ENCOUNTER (OUTPATIENT)
Dept: BEHAVIORAL HEALTH | Facility: CLINIC | Age: 54
End: 2019-01-24
Attending: SOCIAL WORKER
Payer: COMMERCIAL

## 2019-01-24 PROCEDURE — H2035 A/D TX PROGRAM, PER HOUR: HCPCS | Mod: HQ

## 2019-01-24 NOTE — PROGRESS NOTES
"  Key to reading dimensions:   -Bolded text in a dimension indicates information about a client at service initiation.  -Underlined portions in a dimension indicate important information that writer is carrying forward from week to week as a reminder.    CD ADULT Progress Note     Treatment Plan Review completed on:  1.24.2019    Attendance Dates: 1.21, 1.22, 1.23, 1.24  Total # of Group Sessions for Phase I: 21  Total # of Group Sessions in Phase II:  N/A  Total # of Group Sessions in Phase III:  N/A  Total # of 1:1 Sessions: 5.       MONDAY TUESDAY WEDNESDAY THURSDAY FRIDAY SATURDAY SUNDAY TOTAL HOURS   Group Therapy 2   2 3 2    9   Specialty Groups*            1:1           Family Program           [place]             Phase III             Absent (place \"X\")             Total's        9     *Specialty Groups include Mental Health Care, Assertiveness and Communication, Sobriety Maintenance Skills, Spiritual Care, Stress Management, Relapse Prevention, Family Systems.                             Learning Style:    Hands on  verbal    Staff member contributing: Grecia Bashir MS, [facility]     Received supervision: No    Client contributed to goals and plan: Yes (explain) - recent use episode on 1.11.2019    Client received a signed copy of treatment plan/revised plan: Yes    Changes to Treatment Plan: added additional Relapse triggers and cue assignments    Client agrees with plan/revised plan: Yes    Any changes in Vulnerable Adult Status:  No    1) Care Coordination Activities: None this week  2) Medical, Mental Health and other appointments the client attended: SEE DIM's II & III below.  3) Medication issues: No.  4) Physical and mental health problems: SEE DIM's II & III below.  5) Review and evaluation of the individual abuse prevention plan: N/A     Substance Use Disorders:    303.90 (F10.20) Alcohol Use Disorder Severe  305.20 (F12.10) Cannabis Use Disorder Mild  305.60 (F14.10) Cocaine Use Disorder " "Mild    ASAM Risk Ratings and Data     DIMENSION 1: Acute Intoxication/Withdrawal  The client's ability to cope with withdrawal symptoms and current state of intoxication     Acute Intoxication/Withdrawal - Current Risk Factor:  0    Reporting a sober date of: 12/5/2018 for Alcohol, cocaine use 1/12/2019.    Goals:  Develop effective strategies to maintain sobriety. Report any substance or alcohol use to both counselor and the group.     Data: Client denied nor demonstrated any signs/symptomology of intoxication and/or withdrawal. Client denied having any symptoms of PAW. Client admitted to last use date with alcohol to be 12/5/2019 and admitted to recent \"use\" episodes on 1/12/2019 with cocaine.   1/17/2019:  Client provided a positive UA for cocaine. 1/21/2019: Client provided UA which tested negative for all substances.    DIMENSION 2:  Biomedical Conditions and Complaints  The degree to which any physical disorder would interfere with treatment for substance abuse and the client's ability to tolerate any related discomfort     Biomedical Conditions and Complaints - Current Risk Factor:  1    Goals: Disclose CD status to medical providers and follow up with medical interventions while in DOP.     Goal: Client will effectively manage his physical health and will follow recommendations of your medical provider.    Data: Client sees Dr. Salomon Green and NP Tammy Cheatham, [facility] diverticulitits, high blood pressure, chronic neck and back pain.      Client rated his overall health on a scale of 1-10 (1=poor, 10=excellent): 8. Client reports doing the following for self-care during the past week; \"mindfulness and breathing.\"  Client reports the following changes to his physical health over the past week; \"none\".  Client denies having any upcoming doctor s appointments.  Client has access to his medical facility.    DIMENSION 3:  Emotional/Behavioral/Cognitive Conditions and Complications  The degree to " "which any condition or complications are likely to interfere with treatment for substance abuse or with function in significant life areas and the likelihood of risk of harm to self or others.     Emotional/Behavioral - Current Risk Factor:  1    DSM-5 Diagnoses:   F41.8 (300.09): Other Specified Anxiety Disorder; generalized anxiety disorder, not occurring more days than not.   Suicide Assessment:   Risk Status    Ideation - Active thoughts of suicide Intent to follow through on suicide Plan for completing suicide    Yes No Yes No Yes No   Emergent  x  x  x   Urgent / Non-Emergent  x  x  x   Non- Urgent  x  x  x   No Current/Active Risk  x  x  x      Goals: To develop affective tools to manage his emotions.  Goal:  Learn and utilize coping skills to manage his irrational believes and struggles with impulsivity in a healthy way.  Goals:  Increase awareness and understanding of the relationship between mental health and substance use.     Data: Client reports feeling the following two emotions today; \"content and happy.\" Client rated the following MH symptoms on a scale of 1-10 (0=did not endorse): sleeplessness- 2, fatigue- 0, difficulty concentrating- 0, mood swings- 0 , irritability- 1, sadness- 0, excessive sleep- 0, racing thoughts- 0, hopelessness- 0. Client denies experiencing any other aforementioned symptomology over the past week. Client reports taking the following psychotropic medications; Lisinopril. Client identified his 2 stressors of \"Work and daily bombardment of \"how you doing\".  He is addressing his 2 stressors by \"rest, time for me and music\".  Client reports next appointment with Alfreda JOY (psychotherapist) on: 1.31.2019.   Client denied suicidal ideation or self-injurious behavior.      DIMENSION 4:  Readiness to Change  Consider the amount of support and encouragement necessary to keep the client involved in treatment.     Readiness to Change - Current Risk Factor:  1    Goals:  Increase " "awareness with how substance use is conflicted with personal values and address any ambivalence to change.   Goals:  Address ambivalence regarding substance use and sobriety.  Goals:  Increase insight into how use has affected you and those around you  Goals:  Increase and/or maintain internal motivation to achieve sobriety from substances.    Data: Client rated their motivation of the week on a scale of 1-10 (1=low, 10= high): 9. Client reports his main motivation for sobriety is \"family\". Client reports \" free time and stress\"  is what blocks motivation for sobriety for him. Client reports using the following coping strategies that are helpful for his continued sobriety: \"breathing strategy\". Client reports wanting to learn the following coping skills and/or to practice: \"better communication with his wife\".  Client is currently in the Contemplation Stage of Change Model     I:  Client presented \"What do I believe in about myself\". Client identified strengths that he has, as well as areas he'd like to work on within himself.     A:  Client appeared engaged with assignment and provided good insight into his positive attributes.       P:  Client will continue to do his weekly personal assignments. Client is transitioning is Phase II on 1.28.2019 and will attend group 3x times a week.        DIMENSION 5:  Relapse/Continued Use/Continued Problem Potential  Consider the degree to which the client recognizes relapse issues and has the skills to prevent relapse of either substance use or mental health problems.     Relapse/Continued Use/Continued Problem Potential - Current Risk Factor:  2    Goals:  Increase awareness of the disease concept of addiction and insight into personal relapse triggers and strategies to address.   Goals:  Identify and address relapse triggers and cues.    Data: Client rated his \"Average Craving Rating\" on a scale of 1-10 (1=low, 10= high): 1. Client denies any triggers to use this week. Client " "reports that spending quality family time and reconnecting with them has minimized cravings for him. Client reports attending 1 sober support meeting over the week. Client was able to reconnect with a friend who is sober and discuss a life of recovery.       DIMENSION 6:  Recovery Environment  Consider the degree to which key areas of the client's life are supportive of or antagonistic to treatment participation and recovery.     Recovery Environment - Current Risk Factor: 2    Support group meetings attended this week: 1  Where: [facility] [place]  If zero attendance, what s preventing you: Client indicated that it was due to treatment schedule change    Do you currently have a sponsor: Yes    Did you have contact with your sponsor this week? yes    Did family agree to attend family week:  yes    If yes: 01/28/2019 with wife and son    Goals:  Increase sober support network. Continue to identify and attend sober support group meetings. Goal  Goal:  Implement and/or improve positive daily structure and routine.  Goal:  Build sober social support in community.  Goal:  Learn and practice communication and assertiveness skills.  Goal;  ddress relationship conflicts with family.    Data: Client rated their \"Living Situation\" on a scale of 1-10 (1=very helpful, 10=very stressful): 1. Client reports that he is living with his wife and son and reports that his wife is very helpful.  My wife is supportive.  Client reported that he has a son who has mild signs of Autism. Client reports during the past week, he \"informed another neighbor about treatment\"  as a way to expand his sober support network. Client reports that his communication has improved with his wife and children.  Client indicated that he is working less and therefore feels less stressful. Client denies having any legal issues.      Employment: Client reports he is employed full-time.  Volunteerism: No.        Grecia Bashir, MS, LADC  "

## 2019-01-24 NOTE — PROGRESS NOTES
"Iker Stephenson  January 23, 2019  [id number]    [facility] Mental Health Process Group Note      Day and Date:  Wednesday, 1/23/19     Number of participants:  4     Length of Group: 3 hours    Goal of the Group: Clients learn key concepts of traditional CBT (cognitive distortions, conditioning, automatic thoughts, reframing) and then build on these by learning three core concepts of third wave therapies (ACT, DBT, MB): Self-as-Context, Values and Committed Action. The objective is to increase psychological flexibility and choose behaviors that serve the clients  personal values.        Rating scales are 1-10, with 1 being low and 10 being high or most severe.     [place]: Group Topics and Activities     I.  D3 Intervention and Group Topic addressed: Mental Health Part 2    II. Mindfulness and/or Motivational Component:  Values and Committed Action, Psychological Flexibility, Excerpts from documentary  I AM  to clarify values and emphasize the importance of community and connection to others.      III. Additional Activity:  Completed examples of  auto-  behavior vs. values based actions, completed the AAQ-II scale; identified personal value to work on this week +  create a  goal that reflects this value + commit to one step toward that goal for this week.       IV. Review of Progress:   A. Client's self-report:  Iker stated that his week started out kind of rough as he resented having to take a UA \"so close to my relapse date\" but he took another on Monday and was clean. He said it was all smoothed over and he understands why now.  He had a good weekend with his family and spent quality time with them.  He is sleeping much better and usually able to sleep 4 hours before waking up and then being able to fall back to sleep.  His did his journal 3 times this week and he is doing deep breathing and mindfulness for 5 minutes each night. His stress is a 2 and he had no cravings this week.      B. Therapist's Assessment:   " "  Iker participates fully in group and he adds honest comments and suggested to a member new to sobriety to \"dive into the assignments when you come home\" and this will distract them from thinking about habitual use after work.    The value chosen to work on this week: Kindness  The goal that reflects this value:  Be more mindful of other people's thoughts and feeling.   The step to take this week toward this goal: Talk to wife more and call his brothers and sisters and let them know how important his connection to them is.     V.   Reflection and evaluation of today s group experience:  Gave verbal feedback and filled out evaluation form. The most important thing learned today was \"That I important in this large picture that is earth and humanity\" and he would like to \"learn how Not to be so competitive\" and \"I am not alone in the large scale of awareness.\"     VI. Assignments or activities to do for next week: Continue to do the journal and mindfulness, practice cognitive defusion, recognize the \"observing self\" and notice what values are reflected in daily choices and decisions.  Follow through on your STEP 1 from today s value s work.     Therapeutic techniques in this session:  Motivational Therapy, CBT/DBT/ACT, Supportive, Behavioral Modification and Mindfulness     Additional Treatment Referrals/Follow-up Issues to Address: Not indicated at this time.      Change in Treatment Plan: Not indicated at this time. This session completes one Tx Plan intervention under D3.       Change in Diagnosis: Not at this time.      "

## 2019-01-31 ENCOUNTER — HOSPITAL ENCOUNTER (OUTPATIENT)
Dept: BEHAVIORAL HEALTH | Facility: CLINIC | Age: 54
End: 2019-01-31
Attending: SOCIAL WORKER
Payer: COMMERCIAL

## 2019-01-31 PROCEDURE — H2035 A/D TX PROGRAM, PER HOUR: HCPCS | Mod: HQ

## 2019-02-01 NOTE — PROGRESS NOTES
"  Key to reading dimensions:   -Bolded text in a dimension indicates information about a client at service initiation.  -Underlined portions in a dimension indicate important information that writer is carrying forward from week to week as a reminder.    CD ADULT Progress Note     Treatment Plan Review completed on:  1.31.2019    Attendance Dates: Group was cancelled due to extreme weather Monday-Wednesday. Client attended on 1.31.2019.  Total # of Group Sessions for Phase I: 21  Total # of Group Sessions in Phase II:  1  Total # of Group Sessions in Phase III:  N/A  Total # of 1:1 Sessions: 5.       MONDAY TUESDAY WEDNESDAY THURSDAY FRIDAY SATURDAY SUNDAY TOTAL HOURS   Group Therapy      2    2   Specialty Groups*            1:1           Family Program           [place]             Phase III             Absent (place \"X\")             Total's        2     *Specialty Groups include Mental Health Care, Assertiveness and Communication, Sobriety Maintenance Skills, Spiritual Care, Stress Management, Relapse Prevention, Family Systems.                             Learning Style:    Hands on  verbal    Staff member contributing: Grecia Bashir MS, [facility]     Received supervision: No    Client contributed to goals and plan: Yes (explain) - recent use episode on 1.11.2019    Client received a signed copy of treatment plan/revised plan: Yes    Changes to Treatment Plan: added additional Relapse triggers and cue assignments    Client agrees with plan/revised plan: Yes    Any changes in Vulnerable Adult Status:  No    1) Care Coordination Activities: None this week  2) Medical, Mental Health and other appointments the client attended: SEE DIM's II & III below.  3) Medication issues: No.  4) Physical and mental health problems: SEE DIM's II & III below.  5) Review and evaluation of the individual abuse prevention plan: N/A     Substance Use Disorders:    303.90 (F10.20) Alcohol Use Disorder Severe  305.20 (F12.10) Cannabis Use " "Disorder Mild  305.60 (F14.10) Cocaine Use Disorder Mild    ASAM Risk Ratings and Data     DIMENSION 1: Acute Intoxication/Withdrawal  The client's ability to cope with withdrawal symptoms and current state of intoxication     Acute Intoxication/Withdrawal - Current Risk Factor:  0    Reporting a sober date of: 12/5/2018 for Alcohol, cocaine use 1/12/2019.    Goals:  Develop effective strategies to maintain sobriety. Report any substance or alcohol use to both counselor and the group.     Data: Client denied nor demonstrated any signs/symptomology of intoxication and/or withdrawal. Client denied having any symptoms of PAW. Client admitted to last use date with alcohol to be 12/5/2019 and admitted to recent \"use\" episodes on 1/12/2019 with cocaine.   1/17/2019:  Client provided a positive UA for cocaine. 1/21/2019: Client provided UA which tested negative for all substances.    DIMENSION 2:  Biomedical Conditions and Complaints  The degree to which any physical disorder would interfere with treatment for substance abuse and the client's ability to tolerate any related discomfort     Biomedical Conditions and Complaints - Current Risk Factor:  1    Goals: Disclose CD status to medical providers and follow up with medical interventions while in DOP.     Goal: Client will effectively manage his physical health and will follow recommendations of your medical provider.    Data: Client sees Dr. Salomon Green and NP Tammy Cheatham, [facility] diverticulitits, high blood pressure, chronic neck and back pain.      Client rated his overall health on a scale of 1-10 (1=poor, 10=excellent): 8. Client reports doing the following for self-care during the past week; \"mindfulness and breathing.\"  Client reports the following changes to his physical health over the past week; \"none\".  Client denies having any upcoming doctor s appointments.  Client has access to his medical facility.    DIMENSION 3:  " "Emotional/Behavioral/Cognitive Conditions and Complications  The degree to which any condition or complications are likely to interfere with treatment for substance abuse or with function in significant life areas and the likelihood of risk of harm to self or others.     Emotional/Behavioral - Current Risk Factor:  1    DSM-5 Diagnoses:   F41.8 (300.09): Other Specified Anxiety Disorder; generalized anxiety disorder, not occurring more days than not.   Suicide Assessment:   Risk Status    Ideation - Active thoughts of suicide Intent to follow through on suicide Plan for completing suicide    Yes No Yes No Yes No   Emergent  x  x  x   Urgent / Non-Emergent  x  x  x   Non- Urgent  x  x  x   No Current/Active Risk  x  x  x      Goals: To develop affective tools to manage his emotions.  Goal:  Learn and utilize coping skills to manage his irrational believes and struggles with impulsivity in a healthy way.  Goals:  Increase awareness and understanding of the relationship between mental health and substance use.     Data: Client reports feeling the following two emotions today; \"content and happy.\" Client rated the following MH symptoms on a scale of 1-10 (0=did not endorse): sleeplessness- 2, fatigue- 2, difficulty concentrating- 0, mood swings- 2 , irritability- 1, sadness- 0, excessive sleep- 0, racing thoughts- 0, hopelessness- 0. Client denies experiencing any other aforementioned symptomology over the past week. Client reports taking the following psychotropic medications; Lisinopril. Client identified his 2 stressors of \"work and fatherhood\".  He is addressing his 2 stressors by \"learning to not be upset about the small stuffc\".  Client reports next appointment with Alfreda JOY (psychotherapist) on: 2.4.2019.   Client denied suicidal ideation or self-injurious behavior.      DIMENSION 4:  Readiness to Change  Consider the amount of support and encouragement necessary to keep the client involved in treatment. " "    Readiness to Change - Current Risk Factor:  1    Goals:  Increase awareness with how substance use is conflicted with personal values and address any ambivalence to change.   Goals:  Address ambivalence regarding substance use and sobriety.  Goals:  Increase insight into how use has affected you and those around you  Goals:  Increase and/or maintain internal motivation to achieve sobriety from substances.    Data: Client rated their motivation of the week on a scale of 1-10 (1=low, 10= high): 8. Client reports his main motivation for sobriety is \"family\". Client reports \" free time and stress\"  is what blocks motivation for sobriety for him. Client reports using the following coping strategies that are helpful for his continued sobriety: \"breathing strategy\". Client reports wanting to learn the following coping skills and/or to practice: \"better communication with his wife\".  Client is currently in the Contemplation Stage of Change Model           DIMENSION 5:  Relapse/Continued Use/Continued Problem Potential  Consider the degree to which the client recognizes relapse issues and has the skills to prevent relapse of either substance use or mental health problems.     Relapse/Continued Use/Continued Problem Potential - Current Risk Factor:  2    Goals:  Increase awareness of the disease concept of addiction and insight into personal relapse triggers and strategies to address.   Goals:  Identify and address relapse triggers and cues.    Data: Client rated his \"Average Craving Rating\" on a scale of 1-10 (1=low, 10= high): 1. Client denies any triggers to use this week. Client reports that spending quality family time and reconnecting with them has minimized cravings for him. Client reports attending 1 sober support meeting over the week. Client was able to connect with his neighbor who is currently sober and identifies this as expanding his sober support.       DIMENSION 6:  Recovery Environment  Consider the " "degree to which key areas of the client's life are supportive of or antagonistic to treatment participation and recovery.     Recovery Environment - Current Risk Factor: 2    Support group meetings attended this week: 1  Where: [place]  If zero attendance, what s preventing you: Client indicated that it was due to treatment schedule change    Do you currently have a sponsor: Yes    Did you have contact with your sponsor this week? yes    Did family agree to attend family week:  yes    If yes: 01/28/2019 with wife and son. Family group was cancelled due to weather.    Goals:  Increase sober support network. Continue to identify and attend sober support group meetings. Goal  Goal:  Implement and/or improve positive daily structure and routine.  Goal:  Build sober social support in community.  Goal:  Learn and practice communication and assertiveness skills.  Goal;  ddress relationship conflicts with family.    Data: Client rated their \"Living Situation\" on a scale of 1-10 (1=very helpful, 10=very stressful): 1. Client reports that he is living with his wife and son and reports that his wife is very helpful.  My wife is supportive.  Client reported that he has a son who has mild signs of Autism. Client reports during the past week, he \"informed another neighbor about treatment\"  as a way to expand his sober support network. Client reports that his communication has improved with his wife and children.  Client indicated that he is working less and therefore feels less stressful. Client denies having any legal issues.      Employment: Client reports he is employed full-time.  Volunteerism: No.        Grecia Bashir MS, [facility]  "

## 2019-02-04 ENCOUNTER — HOSPITAL ENCOUNTER (OUTPATIENT)
Dept: BEHAVIORAL HEALTH | Facility: CLINIC | Age: 54
End: 2019-02-04
Attending: SOCIAL WORKER
Payer: COMMERCIAL

## 2019-02-04 PROCEDURE — H2035 A/D TX PROGRAM, PER HOUR: HCPCS | Mod: HQ

## 2019-02-11 ENCOUNTER — HOSPITAL ENCOUNTER (OUTPATIENT)
Dept: BEHAVIORAL HEALTH | Facility: CLINIC | Age: 54
End: 2019-02-11
Attending: SOCIAL WORKER
Payer: COMMERCIAL

## 2019-02-11 PROCEDURE — H2035 A/D TX PROGRAM, PER HOUR: HCPCS | Mod: HQ

## 2019-02-12 ENCOUNTER — HOSPITAL ENCOUNTER (OUTPATIENT)
Dept: BEHAVIORAL HEALTH | Facility: CLINIC | Age: 54
End: 2019-02-12
Attending: SOCIAL WORKER
Payer: COMMERCIAL

## 2019-02-12 PROCEDURE — H2035 A/D TX PROGRAM, PER HOUR: HCPCS | Performed by: SOCIAL WORKER

## 2019-02-12 PROCEDURE — H2035 A/D TX PROGRAM, PER HOUR: HCPCS | Mod: HQ | Performed by: SOCIAL WORKER

## 2019-02-12 NOTE — PROGRESS NOTES
"Progress Note:  11:45 AM - 12:00 PM      Data: Meagan arrived early for session and we started at that time. He was calm and had a bright affect and said things were going very well for him. I followed up on ongoing concerns we are working on:  Sleep cycle:  I m sleeping really well  and he only wakes to use the bathroom and is able to go right back to sleep.    Kindness theme,  especially to wife: \"I think I ve been nicer, I notice when I go out of my way to be nice  and he s closer to his son as well. He makes a point to spend more time with his son and be authentic with him.    Irritability: \"This is mainly with the weather lately and the travel I have to do in it.\"  He added,  I have gotten over my being mad at my boss and realize that missing out on my bonus was due to my actions, not his. He s actually been very supportive.     Mild anxiety:  rates 5-6 out of 10 with 10 highest stress.  He said,  I m slowing it down, dialing it back, able to let things go  and he practices staying in the present moment more.  He attributes his stress and anxiety to  I try and do too much at a time, I have a certain routine to do before I go to bed and come home late and I m tired.  He hears himself saying  I just want to be done.   His wife encourages him to quit for the night.  He uses deep breathing daily and mindfulness for 5 minutes before he sleeps.    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  There is a plan at work to relieve him of his on-call hours and now they are reduced to one weekend, every 8 weeks, instead of entire weeks.   Minimal depression: I don t feel depression and I m pretty energetic.   Grief & Loss: (mother and unborn baby):   I still miss my mother but she comes to me in my brain  and he then feels a connection to her. He feels it is normal grief because they were so close. "     Intervention: Individual session, motivational and supportive therapy, problem solving, processing through emotions, cognitive distortions, effects of addiction    Assessment:  He is enjoying music more and plays his guitar more now. He and wife are going a concert on Sat which will be the first one when he is sober.    Appearance/ Affect:  Looked rested and calm, his affect was bright and he said  I feel good, I m really happy  and  I m 150 times better than when I started  and his diverticulitis is not active.  I m not crabby.    We are exploring his drive to do so much in a day before he feels he s earned rest.     Plan:   He is going to explore the underlying issues under his marathon task list he gives himself daily. He considers this habit the major source of his anxiety.  He will also review the cognitive distortions material and identify any patterns he notices in his thinking. He will do this with his wife.  He said he will also finish the worksheet I gave  Challenging Negative Thoughts  a bit different than cognitive defusion.   He wants to continue these sessions and said,  I m still getting a lot from them.

## 2019-02-14 ENCOUNTER — HOSPITAL ENCOUNTER (OUTPATIENT)
Dept: BEHAVIORAL HEALTH | Facility: CLINIC | Age: 54
End: 2019-02-14
Attending: SOCIAL WORKER
Payer: COMMERCIAL

## 2019-02-14 PROCEDURE — H2035 A/D TX PROGRAM, PER HOUR: HCPCS

## 2019-02-14 PROCEDURE — H2035 A/D TX PROGRAM, PER HOUR: HCPCS | Mod: HQ

## 2019-02-15 NOTE — PROGRESS NOTES
"  Key to reading dimensions:   -Bolded text in a dimension indicates information about a client at service initiation.  -Underlined portions in a dimension indicate important information that writer is carrying forward from week to week as a reminder.    CD ADULT Progress Note     Treatment Plan Review completed on:  2.15.2019    Attendance Dates: Group was cancelled due to extreme weather Monday-Wednesday. Client attended on 1.31.2019.  Total # of Group Sessions for Phase I: 21  Total # of Group Sessions in Phase II:  7  Total # of Group Sessions in Phase III:  N/A  Total # of 1:1 Sessions: 6       MONDAY TUESDAY WEDNESDAY THURSDAY FRIDAY SATURDAY SUNDAY TOTAL HOURS   Group Therapy   2 2 N/A 2    6   Specialty Groups*            1:1    1       Family Program           [place]             Phase III             Absent (place \"X\")             Total's        7     *Specialty Groups include Mental Health Care, Assertiveness and Communication, Sobriety Maintenance Skills, Spiritual Care, Stress Management, Relapse Prevention, Family Systems.                             Learning Style:    Hands on  verbal    Staff member contributing: Grecia Bashir MS, [facility]     Received supervision: No    Client contributed to goals and plan: Yes (explain) - recent use episode on 1.11.2019    Client received a signed copy of treatment plan/revised plan: Yes    Changes to Treatment Plan: added additional Relapse triggers and cue assignments    Client agrees with plan/revised plan: Yes    Any changes in Vulnerable Adult Status:  No    1) Care Coordination Activities: None this week  2) Medical, Mental Health and other appointments the client attended: SEE DIM's II & III below.  3) Medication issues: No.  4) Physical and mental health problems: SEE DIM's II & III below.  5) Review and evaluation of the individual abuse prevention plan: No [facility]    Substance Use Disorders:    303.90 (F10.20) Alcohol Use Disorder Severe  305.20 (F12.10) " "Cannabis Use Disorder Mild  305.60 (F14.10) Cocaine Use Disorder Mild    ASA Risk Ratings and Data     DIMENSION 1: Acute Intoxication/Withdrawal  The client's ability to cope with withdrawal symptoms and current state of intoxication     Acute Intoxication/Withdrawal - Current Risk Factor:  0    Reporting a sober date of: 12/5/2018 for Alcohol, cocaine use 1/12/2019.    Goals:  Develop effective strategies to maintain sobriety. Report any substance or alcohol use to both counselor and the group.     Data: Client denied nor demonstrated any signs/symptomology of intoxication and/or withdrawal. No reported use.    DIMENSION 2:  Biomedical Conditions and Complaints  The degree to which any physical disorder would interfere with treatment for substance abuse and the client's ability to tolerate any related discomfort     Biomedical Conditions and Complaints - Current Risk Factor:  1    Goals: Disclose CD status to medical providers and follow up with medical interventions while in DOP.     Goal: Client will effectively manage his physical health and will follow recommendations of your medical provider.    Data: Client sees Dr. Salomon Green and NP Tammy Cheatham, [facility] diverticulitits, high blood pressure, chronic neck and back pain.      Client rated his overall health on a scale of 1-10 (1=poor, 10=excellent): 8. Client reports doing the following for self-care during the past week; \"mindfulness and breathing.\"  Client reports the following changes to his physical health over the past week; \"none\".  Client denies having any upcoming doctor s appointments.  Client has access to his medical facility.    DIMENSION 3:  Emotional/Behavioral/Cognitive Conditions and Complications  The degree to which any condition or complications are likely to interfere with treatment for substance abuse or with function in significant life areas and the likelihood of risk of harm to self or others.     Emotional/Behavioral " "- Current Risk Factor:  1    DSM-5 Diagnoses:   F41.8 (300.09): Other Specified Anxiety Disorder; generalized anxiety disorder, not occurring more days than not.   Suicide Assessment:   Risk Status    Ideation - Active thoughts of suicide Intent to follow through on suicide Plan for completing suicide    Yes No Yes No Yes No   Emergent  x  x  x   Urgent / Non-Emergent  x  x  x   Non- Urgent  x  x  x   No Current/Active Risk  x  x  x      Goals: To develop affective tools to manage his emotions.  Goal:  Learn and utilize coping skills to manage his irrational believes and struggles with impulsivity in a healthy way.  Goals:  Increase awareness and understanding of the relationship between mental health and substance use.     Data: Client reports feeling the following two emotions today; \"frustrated and happy.\" Client rated the following MH symptoms on a scale of 1-10 (0=did not endorse): sleeplessness- 1, fatigue- 2, difficulty concentrating- 1, mood swings- 2 , irritability- 4, sadness- 1, excessive sleep- 0, racing thoughts- 0, hopelessness- 0. Client denies experiencing any other aforementioned symptomology over the past week. Client reports taking the following psychotropic medications; Lisinopril. Client identified his 2 stressors of \"work and fatherhood\".  He is addressing his 2 stressors by \"learning to not be upset about the small stuff\".  Client reports next appointment with Alfreda JOY (psychotherapist) on: 2.12.2019.   Client denied suicidal ideation or self-injurious behavior.      DIMENSION 4:  Readiness to Change  Consider the amount of support and encouragement necessary to keep the client involved in treatment.     Readiness to Change - Current Risk Factor:  1    Goals:  Increase awareness with how substance use is conflicted with personal values and address any ambivalence to change.   Goals:  Address ambivalence regarding substance use and sobriety.  Goals:  Increase insight into how use has affected " "you and those around you  Goals:  Increase and/or maintain internal motivation to achieve sobriety from substances.    Data: Client rated their motivation of the week on a scale of 1-10 (1=low, 10= high): 8. Client reports his main motivation for sobriety is \"family\". Client reports \" free time\"  is what blocks motivation for sobriety for him. Client reports using the following coping strategies that are helpful for his continued sobriety: \"breathing strategy\". Client reports wanting to learn the following coping skills and/or to practice: \"better communication with his wife\".  Client is currently in the Contemplation Stage of Change Model           DIMENSION 5:  Relapse/Continued Use/Continued Problem Potential  Consider the degree to which the client recognizes relapse issues and has the skills to prevent relapse of either substance use or mental health problems.     Relapse/Continued Use/Continued Problem Potential - Current Risk Factor:  2    Goals:  Increase awareness of the disease concept of addiction and insight into personal relapse triggers and strategies to address.   Goals:  Identify and address relapse triggers and cues.    Data: Client rated his \"Average Craving Rating\" on a scale of 1-10 (1=low, 10= high): 1. Client denies any triggers to use this week. Client reports that spending quality family time and reconnecting with them has minimized cravings for him. Client reports attending 1 sober support meeting over the week. Client was able to connect with his neighbor who is currently sober and identifies this as expanding his sober support.       DIMENSION 6:  Recovery Environment  Consider the degree to which key areas of the client's life are supportive of or antagonistic to treatment participation and recovery.     Recovery Environment - Current Risk Factor: 2    Support group meetings attended this week: 1  Where: [place]  If zero attendance, what s preventing you: N/A    Do you currently have a " "sponsor: Yes    Did you have contact with your sponsor this week? yes    Did family agree to attend family week:  yes    If yes: 01/28/2019 with wife and son. Family group was cancelled due to weather.    Goals:  Increase sober support network. Continue to identify and attend sober support group meetings. Goal  Goal:  Implement and/or improve positive daily structure and routine.  Goal:  Build sober social support in community.  Goal:  Learn and practice communication and assertiveness skills.  Goal;  Address relationship conflicts with family.    Data: Client rated their \"Living Situation\" on a scale of 1-10 (1=very helpful, 10=very stressful): 2. Client reports that he is living with his wife and son and reports that his wife is very helpful.  My wife is supportive.  Client reported that he has a son who has mild signs of Autism. Client reports during the past week that \"nothing\"  as a way to expand his sober support network. Client reports that his communication has improved with his wife and children.  Client indicated that he is working less and therefore feels less stressful. Client denies having any legal issues.      Employment: Client reports he is employed full-time.  Volunteerism: No.        Grecia Bashir, MS, [facility]  "

## 2019-02-18 NOTE — ADDENDUM NOTE
Encounter addended by: Alfreda Hickey [facility] on: 2/17/2019 9:37 PM   Actions taken: Episode edited

## 2019-02-19 ENCOUNTER — HOSPITAL ENCOUNTER (OUTPATIENT)
Dept: BEHAVIORAL HEALTH | Facility: CLINIC | Age: 54
End: 2019-02-19
Attending: SOCIAL WORKER
Payer: COMMERCIAL

## 2019-02-19 PROCEDURE — H2035 A/D TX PROGRAM, PER HOUR: HCPCS | Mod: HQ

## 2019-02-21 ENCOUNTER — HOSPITAL ENCOUNTER (OUTPATIENT)
Dept: BEHAVIORAL HEALTH | Facility: CLINIC | Age: 54
End: 2019-02-21
Attending: SOCIAL WORKER
Payer: COMMERCIAL

## 2019-02-21 PROCEDURE — H2035 A/D TX PROGRAM, PER HOUR: HCPCS | Mod: HQ

## 2019-02-22 NOTE — PROGRESS NOTES
"  Key to reading dimensions:   -Bolded text in a dimension indicates information about a client at service initiation.  -Underlined portions in a dimension indicate important information that writer is carrying forward from week to week as a reminder.    CD ADULT Progress Note     Treatment Plan Review completed on:  2.22.2019    Attendance Dates: excused on 2.18, 2.19, 2.21  Total # of Group Sessions for Phase I: 21  Total # of Group Sessions in Phase II:  9  Total # of Group Sessions in Phase III:  N/A  Total # of 1:1 Sessions: 6 MONDAY TUESDAY WEDNESDAY THURSDAY FRIDAY SATURDAY SUNDAY TOTAL HOURS   Group Therapy   excused 2 N/A 2    4   Specialty Groups*            1:1           Family Program           [place]             Phase III             Absent (place \"X\")             Total's        4     *Specialty Groups include Mental Health Care, Assertiveness and Communication, Sobriety Maintenance Skills, Spiritual Care, Stress Management, Relapse Prevention, Family Systems.                             Learning Style:    Hands on  verbal    Staff member contributing: Grecia Bashir MS, [facility]     Received supervision: No    Client contributed to goals and plan: Yes    Client received a signed copy of treatment plan/revised plan: Yes    Changes to Treatment Plan: added additional Relapse triggers and cue assignments    Client agrees with plan/revised plan: Yes    Any changes in Vulnerable Adult Status:  No    1) Care Coordination Activities: None this week  2) Medical, Mental Health and other appointments the client attended: SEE DIM's II & III below.  3) Medication issues: No.  4) Physical and mental health problems: SEE DIM's II & III below.  5) Review and evaluation of the individual abuse prevention plan: No [facility]    Substance Use Disorders:    303.90 (F10.20) Alcohol Use Disorder Severe  305.20 (F12.10) Cannabis Use Disorder Mild  305.60 (F14.10) Cocaine Use Disorder Mild    ASA Risk Ratings and Data " "    DIMENSION 1: Acute Intoxication/Withdrawal  The client's ability to cope with withdrawal symptoms and current state of intoxication     Acute Intoxication/Withdrawal - Current Risk Factor:  0    Reporting a sober date of: 12/5/2018 for Alcohol, cocaine use 1/12/2019.    Goals:  Develop effective strategies to maintain sobriety. Report any substance or alcohol use to both counselor and the group.     Data: Client denied nor demonstrated any signs/symptomology of intoxication and/or withdrawal. No reported use.    DIMENSION 2:  Biomedical Conditions and Complaints  The degree to which any physical disorder would interfere with treatment for substance abuse and the client's ability to tolerate any related discomfort     Biomedical Conditions and Complaints - Current Risk Factor:  1    Goals: Disclose CD status to medical providers and follow up with medical interventions while in DOP.     Goal: Client will effectively manage his physical health and will follow recommendations of your medical provider.    Data: Client sees Dr. Salomon Green and NP Tammy Cheatham, [facility] diverticulitits, high blood pressure, chronic neck and back pain.      Client rated his overall health on a scale of 1-10 (1=poor, 10=excellent): 8. Client reports doing the following for self-care during the past week; \"mindfulness and breathing.\"  Client reports the following changes to his physical health over the past week; \"none\".  Client denies having any upcoming doctor s appointments.  Client has access to his medical facility.    DIMENSION 3:  Emotional/Behavioral/Cognitive Conditions and Complications  The degree to which any condition or complications are likely to interfere with treatment for substance abuse or with function in significant life areas and the likelihood of risk of harm to self or others.     Emotional/Behavioral - Current Risk Factor:  1    DSM-5 Diagnoses:   F41.8 (300.09): Other Specified Anxiety Disorder; " "generalized anxiety disorder, not occurring more days than not.   Suicide Assessment:   Risk Status    Ideation - Active thoughts of suicide Intent to follow through on suicide Plan for completing suicide    Yes No Yes No Yes No   Emergent  x  x  x   Urgent / Non-Emergent  x  x  x   Non- Urgent  x  x  x   No Current/Active Risk  x  x  x      Goals: To develop affective tools to manage his emotions.  Goal:  Learn and utilize coping skills to manage his irrational believes and struggles with impulsivity in a healthy way.  Goals:  Increase awareness and understanding of the relationship between mental health and substance use.     Data: Client reports feeling the following two emotions today; \"frustrated and stressed.\" Client rated the following MH symptoms on a scale of 1-10 (0=did not endorse): sleeplessness- 1, fatigue- 1, difficulty concentrating- 0, mood swings- 4 , irritability- 3, sadness- 0, excessive sleep- 0, racing thoughts- 0, hopelessness- 0. Client denies experiencing any other aforementioned symptomology over the past week. Client reports taking the following psychotropic medications; Lisinopril. Client reports next appointment with Alfreda JOY (psychotherapist) on: 2.26.2019. Client reports that he is working on \"anxiety\" in his individual MH sessions  Client denied suicidal ideation or self-injurious behavior.      DIMENSION 4:  Readiness to Change  Consider the amount of support and encouragement necessary to keep the client involved in treatment.     Readiness to Change - Current Risk Factor:  1    Goals:  Increase awareness with how substance use is conflicted with personal values and address any ambivalence to change.   Goals:  Address ambivalence regarding substance use and sobriety.  Goals:  Increase insight into how use has affected you and those around you  Goals:  Increase and/or maintain internal motivation to achieve sobriety from substances.    Data: Client rated their motivation of the week " "on a scale of 1-10 (1=low, 10= high): 8. Client reports his main motivation for sobriety is \"family and health\". Client reports \" free time\"  is what blocks motivation for sobriety for him. Client reports using the following coping strategies that are helpful for his continued sobriety: \"meditation\". Client reports wanting to learn the following coping skills and/or to practice: \"none\".  Client is currently in the Contemplation Stage of Change Model.    Intervention: Client presented \"challenging anxious thoughts\" Tx assignment in group. Client identified that \"domestic tasks and waiting for things\" are triggers for his anxiety. Client identified outcomes of his anxiety, such as \"Getting mad\". Client reported that he is currently working with his psychotherapist on how to manage these symptoms of anxiety and anxious thoughts.     Assessment: Client provided meaningful insight into assignment and appeared open and engaged. Client was receptive to accepting feedback from peers.     Plan: Client reports he is currently working on \"Who I ws in Active Addiction\" tx assignment and will present next Thursday.            DIMENSION 5:  Relapse/Continued Use/Continued Problem Potential  Consider the degree to which the client recognizes relapse issues and has the skills to prevent relapse of either substance use or mental health problems.     Relapse/Continued Use/Continued Problem Potential - Current Risk Factor:  2    Goals:  Increase awareness of the disease concept of addiction and insight into personal relapse triggers and strategies to address.   Goals:  Identify and address relapse triggers and cues.    Data: Client rated his \"Average Craving Rating\" on a scale of 1-10 (1=low, 10= high): 1. Client denies any triggers to use this week. Client reports that being busy with his family minimized his cravings. Client reports \"nothing\" as a trigger this past week.     DIMENSION 6:  Recovery Environment  Consider the degree to " "which key areas of the client's life are supportive of or antagonistic to treatment participation and recovery.     Recovery Environment - Current Risk Factor: 2    Support group meetings attended this week: 0  Where: N/A  If zero attendance, what s preventing you: worked late Friday which is when he attends his home meeting.    Do you currently have a sponsor: No    Did you have contact with your sponsor this week? No    Did family agree to attend family week:  yes    If yes: 01/28/2019 with wife and son. Family group was cancelled due to weather.    Goals:  Increase sober support network. Continue to identify and attend sober support group meetings. Goal  Goal:  Implement and/or improve positive daily structure and routine.  Goal:  Build sober social support in community.  Goal:  Learn and practice communication and assertiveness skills.  Goal;  Address relationship conflicts with family.    Data: Client rated their \"Living Situation\" on a scale of 1-10 (1=very helpful, 10=very stressful): 1. Client reports that he is living with his wife and son and reports that his wife is very helpful.  My wife is supportive.  Client reported that he has a son who has mild signs of Autism. Client reports expanding his sober support network by: \"none this week\". Client reports sober activities this week being: \"playing games with my son and working on the house\".    Client denies having any legal issues.      Employment: Client reports he is employed full-time.  Volunteerism: No.        Grecia Bashir MS, [facility]  "

## 2019-02-25 ENCOUNTER — HOSPITAL ENCOUNTER (OUTPATIENT)
Dept: BEHAVIORAL HEALTH | Facility: CLINIC | Age: 54
End: 2019-02-25
Attending: SOCIAL WORKER
Payer: COMMERCIAL

## 2019-02-25 PROCEDURE — H2035 A/D TX PROGRAM, PER HOUR: HCPCS | Mod: HQ

## 2019-02-26 ENCOUNTER — HOSPITAL ENCOUNTER (OUTPATIENT)
Dept: BEHAVIORAL HEALTH | Facility: CLINIC | Age: 54
End: 2019-02-26
Attending: SOCIAL WORKER
Payer: COMMERCIAL

## 2019-02-26 PROCEDURE — H2035 A/D TX PROGRAM, PER HOUR: HCPCS | Performed by: SOCIAL WORKER

## 2019-02-26 NOTE — PROGRESS NOTES
"Progress Note for Individual Session    Data: Iker reported that \"things are going good at home\" and that his boss changed his mind about allowing him to get off the on-call list.  This was very disappointing to Iker and his wife but he didn't allow himself to get angry or argue. Instead, he is working on his resume to find another position with a regular 40 hour week.  His anxiety this week was a 5 due to the work issue and having so much to do in a week. He continues to sleep \"really good now\" and he is spending quality time with his wife and son.  His irritability has \"calmed down now.\"  His symptoms of depression have resolved and may have been part of PAWS.  He was worried about attending a concert completely sober but \"It was was so good and we had a blast\" without using.  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      Assessment: Iker said he feels his anxiety when trying to complete so many tasks a day is starting to diminish a bit, but he still gets anxious and superficially he thinks \"I am afraid of not getting everything done that I said I would do.\"  He is starting to realize this is on the surface but not the real reason.  He caught himself getting distracted from a task and telling himself \"that can wait, not today.\"  He has purposely scratched out some of the things on his list and \"everything was just fine when they weren't done.\" He is now using a pending a list and then just putting down 2 or 3 things to get done in a weekend. He feels that part of this compulsion to complete so much may reflect on his using days when he could minimize his guilt by getting so much done around the house. He was very " "anxious because he had to miss his Friday AA meeting due to an emergency job at work and when he went back the following Friday \"everything was fine, I was so anxious but everyone understood and no one thought I used.\"  He is using this as an example of anxiety being a false alarm.      Plan: Iker has not completed the 2 worksheets from last session although he did complete the cognitive distortions and paying attention to the thoughts that accompany his anxiety. For next week he will complete the 2 worksheets that he hasn't finished yet and follow with treatment plan guidelines.       "

## 2019-02-28 ENCOUNTER — HOSPITAL ENCOUNTER (OUTPATIENT)
Dept: BEHAVIORAL HEALTH | Facility: CLINIC | Age: 54
End: 2019-02-28
Attending: SOCIAL WORKER
Payer: COMMERCIAL

## 2019-02-28 PROCEDURE — H2035 A/D TX PROGRAM, PER HOUR: HCPCS | Mod: HQ

## 2019-03-01 NOTE — PROGRESS NOTES
"  Key to reading dimensions:   -Bolded text in a dimension indicates information about a client at service initiation.  -Underlined portions in a dimension indicate important information that writer is carrying forward from week to week as a reminder.    CD ADULT Progress Note     Treatment Plan Review completed on:  3.1.2019    Attendance Dates: 2.25, excused 2.26, 2.28  Total # of Group Sessions for Phase I: 21  Total # of Group Sessions in Phase II:  9  Total # of Group Sessions in Phase III:  N/A  Total # of 1:1 Sessions: 6 MONDAY TUESDAY WEDNESDAY THURSDAY FRIDAY SATURDAY SUNDAY TOTAL HOURS   Group Therapy   2 e N/A 2    4   Specialty Groups*            1:1           Family Program           [place]             Phase III             Absent (place \"X\")             Total's        4     *Specialty Groups include Mental Health Care, Assertiveness and Communication, Sobriety Maintenance Skills, Spiritual Care, Stress Management, Relapse Prevention, Family Systems.                             Learning Style:    Hands on  verbal    Staff member contributing: Grecia Bashir MS, MANGO     Received supervision: No    Client contributed to goals and plan: Yes    Client received a signed copy of treatment plan/revised plan: Yes    Changes to Treatment Plan: added additional Relapse triggers and cue assignments    Client agrees with plan/revised plan: Yes    Any changes in Vulnerable Adult Status:  No    1) Care Coordination Activities: None this week  2) Medical, Mental Health and other appointments the client attended: SEE DIM's II & III below.  3) Medication issues: No.  4) Physical and mental health problems: SEE DIM's II & III below.  5) Review and evaluation of the individual abuse prevention plan: No changes    Substance Use Disorders:    303.90 (F10.20) Alcohol Use Disorder Severe  305.20 (F12.10) Cannabis Use Disorder Mild  305.60 (F14.10) Cocaine Use Disorder Mild    ASAM Risk Ratings and Data     DIMENSION " "1: Acute Intoxication/Withdrawal  The client's ability to cope with withdrawal symptoms and current state of intoxication     Acute Intoxication/Withdrawal - Current Risk Factor:  0    Reporting a sober date of: 12/5/2018 for Alcohol, cocaine use 1/12/2019.    Goals:  Develop effective strategies to maintain sobriety. Report any substance or alcohol use to both counselor and the group.     Data: Client denied nor demonstrated any signs/symptomology of intoxication and/or withdrawal. No reported use.    DIMENSION 2:  Biomedical Conditions and Complaints  The degree to which any physical disorder would interfere with treatment for substance abuse and the client's ability to tolerate any related discomfort     Biomedical Conditions and Complaints - Current Risk Factor:  1    Goals: Disclose CD status to medical providers and follow up with medical interventions while in DOP.     Goal: Client will effectively manage his physical health and will follow recommendations of your medical provider.    Data: Client sees Dr. Salomon Green and NP Tammy Cheatham, [facility] high blood pressure, chronic neck and back pain.      Client rated his overall health on a scale of 1-10 (1=poor, 10=excellent): 8. Client reports doing the following for self-care during the past week; \"mindfulness and breathing.\"  Client reports the following changes to his physical health over the past week; \"none\".  Client denies having any upcoming doctor s appointments.  Client has access to his medical facility.    DIMENSION 3:  Emotional/Behavioral/Cognitive Conditions and Complications  The degree to which any condition or complications are likely to interfere with treatment for substance abuse or with function in significant life areas and the likelihood of risk of harm to self or others.     Emotional/Behavioral - Current Risk Factor:  1    DSM-5 Diagnoses:   F41.8 (300.09): Other Specified Anxiety Disorder; generalized " "anxiety disorder, not occurring more days than not.   Suicide Assessment:   Risk Status    Ideation - Active thoughts of suicide Intent to follow through on suicide Plan for completing suicide    Yes No Yes No Yes No   Emergent  x  x  x   Urgent / Non-Emergent  x  x  x   Non- Urgent  x  x  x   No Current/Active Risk  x  x  x      Goals: To develop affective tools to manage his emotions.  Goal:  Learn and utilize coping skills to manage his irrational believes and struggles with impulsivity in a healthy way.  Goals:  Increase awareness and understanding of the relationship between mental health and substance use.     Data: Client reports feeling the following two emotions today; \"frustrated and stressed.\" Client rated the following MH symptoms on a scale of 1-10 (0=did not endorse): sleeplessness- 1, fatigue- 1, difficulty concentrating- 0, mood swings- 4 , irritability- 3, sadness- 0, excessive sleep- 0, racing thoughts- 0, hopelessness- 0. Client denies experiencing any other aforementioned symptomology over the past week. Client reports taking the following psychotropic medications; Lisinopril. Client reports next appointment with Alfreda JOY (psychotherapist) on: 2.26.2019. Client reports that he is working on \"anxiety\" in his individual MH sessions  Client denied suicidal ideation or self-injurious behavior.      DIMENSION 4:  Readiness to Change  Consider the amount of support and encouragement necessary to keep the client involved in treatment.     Readiness to Change - Current Risk Factor:  1    Goals:  Increase awareness with how substance use is conflicted with personal values and address any ambivalence to change.   Goals:  Address ambivalence regarding substance use and sobriety.  Goals:  Increase insight into how use has affected you and those around you  Goals:  Increase and/or maintain internal motivation to achieve sobriety from substances.    Data: Client rated their motivation of the week on a scale of " "1-10 (1=low, 10= high): 8. Client reports his main motivation for sobriety is \"family and health\". Client reports \" free time\"  is what blocks motivation for sobriety for him. Client reports using the following coping strategies that are helpful for his continued sobriety: \"meditation\". Client reports wanting to learn the following coping skills and/or to practice: \"none\".  Client is currently in the Contemplation Stage of Change Model.    Intervention: Client presented \"What's happening in my early recovery\" Tx assignment in group. Client identified what types of feelings he has been experiencing and how they are different from when he was drinking alcohol or using drugs.  Client identified what he hopes to gain with continued sobriety and living a life of active recovery.    Assessment: Client provided meaningful insight into assignment and appeared open and engaged. Client was receptive to accepting feedback from peers.     Plan: continue to present Tx assignments in group next week.            DIMENSION 5:  Relapse/Continued Use/Continued Problem Potential  Consider the degree to which the client recognizes relapse issues and has the skills to prevent relapse of either substance use or mental health problems.     Relapse/Continued Use/Continued Problem Potential - Current Risk Factor:  2    Goals:  Increase awareness of the disease concept of addiction and insight into personal relapse triggers and strategies to address.   Goals:  Identify and address relapse triggers and cues.    Data: Client rated his \"Average Craving Rating\" on a scale of 1-10 (1=low, 10= high): 1. Client denies any triggers to use this week. Client reports that being busy with his family minimized his cravings. Client reports \"nothing\" as a trigger this past week.     DIMENSION 6:  Recovery Environment  Consider the degree to which key areas of the client's life are supportive of or antagonistic to treatment participation and recovery. " "    Recovery Environment - Current Risk Factor: 2    Support group meetings attended this week: 0  Where: N/A  If zero attendance, what s preventing you: worked late Friday which is when he attends his home meeting.    Do you currently have a sponsor: No    Did you have contact with your sponsor this week? No    Did family agree to attend family week:  yes    If yes: 3.18.2019    Goals:  Increase sober support network. Continue to identify and attend sober support group meetings. Goal  Goal:  Implement and/or improve positive daily structure and routine.  Goal:  Build sober social support in community.  Goal:  Learn and practice communication and assertiveness skills.  Goal;  Address relationship conflicts with family.    Data: Client rated their \"Living Situation\" on a scale of 1-10 (1=very helpful, 10=very stressful): 1. Client reports that he is living with his wife and son and reports that his wife is very helpful.  My wife is supportive.  Client reported that he has a son who has mild signs of Autism. Client reports expanding his sober support network by: \"none this week\". Client reports sober activities this week being: \"playing games with my son and working on the house\".    Client denies having any legal issues.      Employment: Client reports he is employed full-time.  Volunteerism: No.        Grecia Bashir MS, [facility]  "

## 2019-03-07 ENCOUNTER — HOSPITAL ENCOUNTER (OUTPATIENT)
Dept: BEHAVIORAL HEALTH | Facility: CLINIC | Age: 54
End: 2019-03-07
Attending: SOCIAL WORKER
Payer: COMMERCIAL

## 2019-03-07 PROCEDURE — H2035 A/D TX PROGRAM, PER HOUR: HCPCS | Mod: HQ

## 2019-03-08 NOTE — PROGRESS NOTES
"  Key to reading dimensions:   -Bolded text in a dimension indicates information about a client at service initiation.  -Underlined portions in a dimension indicate important information that writer is carrying forward from week to week as a reminder.    CD ADULT Progress Note     Treatment Plan Review completed on:  3.8.2019    Attendance Dates: excused due to illness on 3.4 & 3.5, attended 3.7  Group Topics this week: 3.4: Check in and group process             3.5: \"Dealing with Difficult Emotions\" (Anger and Resentment)             3.7: Homework presentations: Use History    Total # of Group Sessions for Phase I: 21  Total # of Group Sessions in Phase II:  10  Total # of Group Sessions in Phase III:  N/A  Total # of 1:1 Sessions: 6 MONDAY TUESDAY WEDNESDAY THURSDAY FRIDAY SATURDAY SUNDAY TOTAL HOURS   Group Therapy   e e N/A 2    2   Specialty Groups*            1:1           Family Program           [place]             Phase III             Absent (place \"X\")             Total's        2     *Specialty Groups include Mental Health Care, Assertiveness and Communication, Sobriety Maintenance Skills, Spiritual Care, Stress Management, Relapse Prevention, Family Systems.                             Learning Style:    Hands on  verbal    Staff member contributing: Grecia Bashir MS, LADC     Received supervision: No    Client contributed to goals and plan: Yes    Client received a signed copy of treatment plan/revised plan: Yes    Changes to Treatment Plan: added additional Relapse triggers and cue assignments    Client agrees with plan/revised plan: Yes    Any changes in Vulnerable Adult Status:  No    1) Care Coordination Activities: None this week  2) Medical, Mental Health and other appointments the client attended: SEE DIM's II & III below.  3) Medication issues: No.  4) Physical and mental health problems: SEE DIM's II & III below.  5) Review and evaluation of the individual abuse prevention plan: No " "changes    Substance Use Disorders:    303.90 (F10.20) Alcohol Use Disorder Severe  305.20 (F12.10) Cannabis Use Disorder Mild  305.60 (F14.10) Cocaine Use Disorder Mild    ASAM Risk Ratings and Data     DIMENSION 1: Acute Intoxication/Withdrawal  The client's ability to cope with withdrawal symptoms and current state of intoxication     Acute Intoxication/Withdrawal - Current Risk Factor:  0    Reporting a sober date of: 12/5/2018 for Alcohol, cocaine use 1/12/2019.    Goals:  Develop effective strategies to maintain sobriety. Report any substance or alcohol use to both counselor and the group.     Data: Client denied nor demonstrated any signs/symptomology of intoxication and/or withdrawal. No reported use.    DIMENSION 2:  Biomedical Conditions and Complaints  The degree to which any physical disorder would interfere with treatment for substance abuse and the client's ability to tolerate any related discomfort     Biomedical Conditions and Complaints - Current Risk Factor:  1    Goals: Disclose CD status to medical providers and follow up with medical interventions while in DOP.     Goal: Client will effectively manage his physical health and will follow recommendations of your medical provider.    Data: Client sees Dr. Salomon Green and NP Tammy Cheatham, [facility] diverticulit[facility], high blood pressure, chronic neck and back pain.      Client rated his overall health on a scale of 1-10 (1=poor, 10=excellent): 5. Client reports that he had the flu and fever over the last few days. Client reports doing the following for self-care during the past week; \"resting.\"  Client reports the following changes to his physical health over the past week; \"had the flu and a fever\".  Client denies having any upcoming doctor s appointments.  Client has access to his medical facility.    DIMENSION 3:  Emotional/Behavioral/Cognitive Conditions and Complications  The degree to which any condition or complications are likely " "to interfere with treatment for substance abuse or with function in significant life areas and the likelihood of risk of harm to self or others.     Emotional/Behavioral - Current Risk Factor:  1    DSM-5 Diagnoses:   F41.8 (300.09): Other Specified Anxiety Disorder; generalized anxiety disorder, not occurring more days than not.   Suicide Assessment:   Risk Status    Ideation - Active thoughts of suicide Intent to follow through on suicide Plan for completing suicide    Yes No Yes No Yes No   Emergent  x  x  x   Urgent / Non-Emergent  x  x  x   Non- Urgent  x  x  x   No Current/Active Risk  x  x  x      Goals: To develop affective tools to manage his emotions.  Goal:  Learn and utilize coping skills to manage his irrational believes and struggles with impulsivity in a healthy way.  Goals:  Increase awareness and understanding of the relationship between mental health and substance use.     Data: Client reports feeling the following two emotions today; \"frustrated and tired.\" Client rated the following MH symptoms on a scale of 1-10 (0=did not endorse): sleeplessness- 2, fatigue- 4, difficulty concentrating- 3, mood swings- 2, irritability- 4, sadness- 0, excessive sleep- 1, racing thoughts- 0, hopelessness- 0. Client denies experiencing any other aforementioned symptomology over the past week. Client reports taking the following psychotropic medications; Lisinopril. Client reports next appointment with Alfreda JOY (psychotherapist) on: 3.12.2019. Client reports that he is working on \"anxiety\" in his individual MH sessions  Client denied suicidal ideation or self-injurious behavior.      DIMENSION 4:  Readiness to Change  Consider the amount of support and encouragement necessary to keep the client involved in treatment.     Readiness to Change - Current Risk Factor:  1    Goals:  Increase awareness with how substance use is conflicted with personal values and address any ambivalence to change.   Goals:  Address " "ambivalence regarding substance use and sobriety.  Goals:  Increase insight into how use has affected you and those around you  Goals:  Increase and/or maintain internal motivation to achieve sobriety from substances.    Data: Client rated their motivation of the week on a scale of 1-10 (1=low, 10= high): 5. Client reports his main motivation for sobriety is \"family and health\". Client reports \" free time\"  is what blocks motivation for sobriety for him. Client reports using the following coping strategies that are helpful for his continued sobriety: \"talking with his wife\". Client reports wanting to learn the following coping skills and/or to practice: \"reading more\".  Client is currently in the Action Stage of Change Model.    Intervention: Client presented his \"Use History\" Tx assignment in group. Client shared his progression with his struggle with cocaine and alcohol. Client reported events that triggered him to increase his use.     Assessment: Client provided meaningful insight into assignment and appeared open and engaged. Client was receptive to accepting feedback from peers.     Plan: continue to present Tx assignments in group next week.            DIMENSION 5:  Relapse/Continued Use/Continued Problem Potential  Consider the degree to which the client recognizes relapse issues and has the skills to prevent relapse of either substance use or mental health problems.     Relapse/Continued Use/Continued Problem Potential - Current Risk Factor:  2    Goals:  Increase awareness of the disease concept of addiction and insight into personal relapse triggers and strategies to address.   Goals:  Identify and address relapse triggers and cues.    Data: Client rated his \"Average Craving Rating\" on a scale of 1-10 (1=low, 10= high): 4. Client denies any triggers to use this week. Client reports that being busy with his family minimized his cravings. Client reports \"nothing\" as a trigger this past week.     DIMENSION " "6:  Recovery Environment  Consider the degree to which key areas of the client's life are supportive of or antagonistic to treatment participation and recovery.     Recovery Environment - Current Risk Factor: 2    Support group meetings attended this week: 0  Where: N/A  If zero attendance, what s preventing you: was sick    Do you currently have a sponsor: No    Did you have contact with your sponsor this week? No    Did family agree to attend family week:  yes    If yes: 3.18.2019    Goals:  Increase sober support network. Continue to identify and attend sober support group meetings. Goal  Goal:  Implement and/or improve positive daily structure and routine.  Goal:  Build sober social support in community.  Goal:  Learn and practice communication and assertiveness skills.  Goal;  Address relationship conflicts with family.    Data: Client rated their \"Living Situation\" on a scale of 1-10 (1=very helpful, 10=very stressful): 1. Client reports that he is living with his wife and son and reports that his wife is very helpful.  My wife is supportive.  Client reported that he has a son who has mild signs of Autism. Client reports expanding his sober support network by: \"none this week\". Client reports sober activities this week being: \"playing games with my son and working on the house\".    Client denies having any legal issues.      Employment: Client reports he is employed full-time.  Volunteerism: No.        Grecia Bashir MS, [facility]  "

## 2019-03-11 ENCOUNTER — HOSPITAL ENCOUNTER (OUTPATIENT)
Dept: BEHAVIORAL HEALTH | Facility: CLINIC | Age: 54
End: 2019-03-11
Attending: SOCIAL WORKER
Payer: COMMERCIAL

## 2019-03-11 PROCEDURE — H2035 A/D TX PROGRAM, PER HOUR: HCPCS | Mod: HQ

## 2019-03-12 ENCOUNTER — HOSPITAL ENCOUNTER (OUTPATIENT)
Dept: BEHAVIORAL HEALTH | Facility: CLINIC | Age: 54
End: 2019-03-12
Attending: SOCIAL WORKER
Payer: COMMERCIAL

## 2019-03-12 PROCEDURE — H2035 A/D TX PROGRAM, PER HOUR: HCPCS | Performed by: SOCIAL WORKER

## 2019-03-12 PROCEDURE — H2035 A/D TX PROGRAM, PER HOUR: HCPCS | Mod: HQ

## 2019-03-13 NOTE — PROGRESS NOTES
Progress for Individual Session: Tuesday, 3/12/19, from 3:15 PM - 4:35 PM (billed 1 hour)    D: Iker reported he had his 54th birthday last week and compared his life situation today with a year ago and said he was doing  so much better, I was in a  pretty dark place.   He had some symptoms of his diverticulitis on Sat night but this passed rather quickly.  He is still planning on switching jobs after he goes to his performance review to see what they offer as an incentive (raise and on-call status). At this time, he is only on-call once weekend in 7 weeks. He said that he driven by the belief  I have to win  but he does not use this in a competitive sense. 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   We went over the cognitive distortions to help him recognize his automatic thoughts and he identified jumping to conclusions, emotional reasoning, and personalization and blame. He said the more he learns about these habits of thought, the less they are affecting him. He has been able to decrease his guilt over not being with his mother when she .  He continues to do his journal and he described his mindfulness before he goes to sleep as  my wiping away he day before I sleep  and  It only takes a few minutes now.       I:  Individual session that included supportive and motivational therapy, review of coping strategies and impact on current symptoms, review of homework on cognitive defusion, reframing and automatic negative thoughts (ANTs), and  plan for following 2 weeks.     A:  Iker is applying many things that are affecting his ability to function in a calmer manner. He has decreased his stress by making many lifestyle changes but still uses  keeping really busy  to distract from thoughts of using. His practice this week is to move him into more relaxation time and less  business  to work through some of his using thoughts. He reported  his sleep was still good and he is not drinking water close to bedtime.  His relationship has improved with his wife as they are spending more quality time together. He has connected his irritability to his on-call hours and these have decreased to only weekends that rotate every 7-9 weeks. His anxiety remains a 5 and he attributes this to his work stress. He said he no longer has  racing thoughts  at night and he takes 10 deep breaths at night to calm his thinking.      P:  Iker agreed to practice the  Notice, Label and Allow  way of cognitive defusion and to start trying to feel his negative emotions by being curious about how they make him feel physically.  He wants to continue to work on his anxiety and practice these new skills and then see me again in 2 weeks.

## 2019-03-14 ENCOUNTER — HOSPITAL ENCOUNTER (OUTPATIENT)
Dept: BEHAVIORAL HEALTH | Facility: CLINIC | Age: 54
End: 2019-03-14
Attending: SOCIAL WORKER
Payer: COMMERCIAL

## 2019-03-14 PROCEDURE — H2035 A/D TX PROGRAM, PER HOUR: HCPCS | Mod: HQ

## 2019-03-15 NOTE — PROGRESS NOTES
"  Key to reading dimensions:   -Bolded text in a dimension indicates information about a client at service initiation.  -Underlined portions in a dimension indicate important information that writer is carrying forward from week to week as a reminder.    CD ADULT Progress Note     Treatment Plan Review completed on:  3.15.2019    Attendance Dates: 3.11, 3.12, 3.14  Group Topics this week: 3.11: Check in and group process             3.12: \"Dealing with Difficult Emotions\" Fear, Worry, Shame, Guiltt)             3.14:  KARSTEN Talk: The Power of Vulnerability by Mike Chavez, Doc- Drugged: High on Alcohol    Total # of Group Sessions for Phase I: 21  Total # of Group Sessions in Phase II:  14  Total # of Group Sessions in Phase III:  N/A  Total # of 1:1 Sessions: 6 MONDAY TUESDAY WEDNESDAY THURSDAY FRIDAY SATURDAY SUNDAY TOTAL HOURS   Group Therapy   x x N/A x    6   Specialty Groups*            1:1           Family Program           [place]             Phase III             Absent (place \"X\")             Total's        6     *Specialty Groups include Mental Health Care, Assertiveness and Communication, Sobriety Maintenance Skills, Spiritual Care, Stress Management, Relapse Prevention, Family Systems.                             Learning Style:    Hands on  verbal    Staff member contributing: Grecia Bashir MS, [facility]     Received supervision: No    Client contributed to goals and plan: Yes    Client received a signed copy of treatment plan/revised plan: Yes    Changes to Treatment Plan: added additional Relapse triggers and cue assignments    Client agrees with plan/revised plan: Yes    Any changes in Vulnerable Adult Status:  No    1) Care Coordination Activities: None this week  2) Medical, Mental Health and other appointments the client attended: SEE DIM's II & III below.  3) Medication issues: No.  4) Physical and mental health problems: SEE DIM's II & III below.  5) Review and evaluation of the individual " "abuse prevention plan: No changes    Substance Use Disorders:    303.90 (F10.20) Alcohol Use Disorder Severe  305.20 (F12.10) Cannabis Use Disorder Mild  305.60 (F14.10) Cocaine Use Disorder Mild    ASAM Risk Ratings and Data     DIMENSION 1: Acute Intoxication/Withdrawal  The client's ability to cope with withdrawal symptoms and current state of intoxication     Acute Intoxication/Withdrawal - Current Risk Factor:  0    Reporting a sober date of: 12/5/2018 for Alcohol, cocaine use 1/12/2019.    Goals:  Develop effective strategies to maintain sobriety. Report any substance or alcohol use to both counselor and the group.     Data: Client denied nor demonstrated any signs/symptomology of intoxication and/or withdrawal. No reported use.    DIMENSION 2:  Biomedical Conditions and Complaints  The degree to which any physical disorder would interfere with treatment for substance abuse and the client's ability to tolerate any related discomfort     Biomedical Conditions and Complaints - Current Risk Factor:  1    Goals: Disclose CD status to medical providers and follow up with medical interventions while in DOP.     Goal: Client will effectively manage his physical health and will follow recommendations of your medical provider.    Data: Client sees Dr. Salomon Green and NP Tammy Cheatham, [facility] diverticulitits, high blood pressure, chronic neck and back pain.      Client rated his overall health on a scale of 1-10 (1=poor, 10=excellent): 8. Client reports that he had the flu and fever over the last few days. Client reports doing the following for self-care during the past week; \"resting.\"  Client reports the following changes to his physical health over the past week; \"none\".  Client denies having any upcoming doctor s appointments.  Client has access to his medical facility.    DIMENSION 3:  Emotional/Behavioral/Cognitive Conditions and Complications  The degree to which any condition or complications are " "likely to interfere with treatment for substance abuse or with function in significant life areas and the likelihood of risk of harm to self or others.     Emotional/Behavioral - Current Risk Factor:  1    DSM-5 Diagnoses:   F41.8 (300.09): Other Specified Anxiety Disorder; generalized anxiety disorder, not occurring more days than not.   Suicide Assessment:   Risk Status    Ideation - Active thoughts of suicide Intent to follow through on suicide Plan for completing suicide    Yes No Yes No Yes No   Emergent  x  x  x   Urgent / Non-Emergent  x  x  x   Non- Urgent  x  x  x   No Current/Active Risk  x  x  x      Goals: To develop affective tools to manage his emotions.  Goal:  Learn and utilize coping skills to manage his irrational believes and struggles with impulsivity in a healthy way.  Goals:  Increase awareness and understanding of the relationship between mental health and substance use.     Data: Client reports feeling the following two emotions today; \"content and strong.\" Client rated the following MH symptoms on a scale of 1-10 (0=did not endorse): sleeplessness- 2, fatigue- 2, difficulty concentrating- 1, mood swings- 1, irritability- 1, sadness- 0, excessive sleep- 1, racing thoughts- 0, hopelessness- 0. Client denies experiencing any other aforementioned symptomology over the past week. Client reports taking the following psychotropic medications; Lisinopril. Client reports next appointment with Alfreda JOY (psychotherapist) on: 3.24.2019. Client reports that he is working on \"anxiety\" in his individual MH sessions  Client denied suicidal ideation or self-injurious behavior.      DIMENSION 4:  Readiness to Change  Consider the amount of support and encouragement necessary to keep the client involved in treatment.     Readiness to Change - Current Risk Factor:  1    Goals:  Increase awareness with how substance use is conflicted with personal values and address any ambivalence to change.   Goals:  Address " "ambivalence regarding substance use and sobriety.  Goals:  Increase insight into how use has affected you and those around you  Goals:  Increase and/or maintain internal motivation to achieve sobriety from substances.    Data: Client rated their motivation of the week on a scale of 1-10 (1=low, 10= high): 8. Client reports his main motivation for sobriety is \"family and health\". Client reports \" free time\"  is what blocks motivation for sobriety for him. Client reports using the following coping strategies that are helpful for his continued sobriety: \"talking with his wife\". Client reports wanting to learn the following coping skills and/or to practice: \"reading more\".  Client is currently in the Action Stage of Change Model.    Intervention: Client processed how he is able to navigate through difficult emotions, such as worry, shame and guilt. Client reported that he continues to feel guilty about his past actions due to his use. Client reported an understanding that he doesn't want to live in the past.     Assessment: Client provided meaningful insight into discussion topic of \"dealing with difficult emotions\" and appeared open and engaged. Client was receptive to accepting feedback from peers.     Plan: continue to present Tx assignments in group next week.            DIMENSION 5:  Relapse/Continued Use/Continued Problem Potential  Consider the degree to which the client recognizes relapse issues and has the skills to prevent relapse of either substance use or mental health problems.     Relapse/Continued Use/Continued Problem Potential - Current Risk Factor:  2    Goals:  Increase awareness of the disease concept of addiction and insight into personal relapse triggers and strategies to address.   Goals:  Identify and address relapse triggers and cues.    Data: Client rated his \"Average Craving Rating\" on a scale of 1-10 (1=low, 10= high): 4. Client denies any triggers to use this week. Client reports that being " "busy with his family minimized his cravings. Client reports \"nothing\" as a trigger this past week.     DIMENSION 6:  Recovery Environment  Consider the degree to which key areas of the client's life are supportive of or antagonistic to treatment participation and recovery.     Recovery Environment - Current Risk Factor: 2    Support group meetings attended this week: 1  Where: [facility]  If zero attendance, what s preventing you: N/A    Do you currently have a sponsor: No    Did you have contact with your sponsor this week? No    Did family agree to attend family week:  yes    If yes: 3.18.2019    Goals:  Increase sober support network. Continue to identify and attend sober support group meetings. Goal  Goal:  Implement and/or improve positive daily structure and routine.  Goal:  Build sober social support in community.  Goal:  Learn and practice communication and assertiveness skills.  Goal;  Address relationship conflicts with family.    Data: Client rated their \"Living Situation\" on a scale of 1-10 (1=very helpful, 10=very stressful): 1. Client reports that he is living with his wife and son and reports that his wife is very helpful.  My wife is supportive.  Client reported that he has a son who has mild signs of Autism. Client reports expanding his sober support network by: \"none this week\". Client reports sober activities this week being: \"playing games with my son and working on the house\".    Client denies having any legal issues.      Employment: Client reports he is employed full-time.  Volunteerism: No.        Grecia Bashir MS, [facility]  "

## 2019-03-18 ENCOUNTER — HOSPITAL ENCOUNTER (OUTPATIENT)
Dept: BEHAVIORAL HEALTH | Facility: CLINIC | Age: 54
End: 2019-03-18
Attending: SOCIAL WORKER
Payer: COMMERCIAL

## 2019-03-18 PROCEDURE — H2035 A/D TX PROGRAM, PER HOUR: HCPCS | Mod: HQ

## 2019-03-19 ENCOUNTER — HOSPITAL ENCOUNTER (OUTPATIENT)
Dept: BEHAVIORAL HEALTH | Facility: CLINIC | Age: 54
End: 2019-03-19
Attending: SOCIAL WORKER
Payer: COMMERCIAL

## 2019-03-19 PROCEDURE — H2035 A/D TX PROGRAM, PER HOUR: HCPCS | Mod: HQ

## 2019-03-21 ENCOUNTER — HOSPITAL ENCOUNTER (OUTPATIENT)
Dept: BEHAVIORAL HEALTH | Facility: CLINIC | Age: 54
End: 2019-03-21
Attending: SOCIAL WORKER
Payer: COMMERCIAL

## 2019-03-21 PROCEDURE — H2035 A/D TX PROGRAM, PER HOUR: HCPCS | Mod: HQ

## 2019-03-22 NOTE — PROGRESS NOTES
"  Key to reading dimensions:   -Bolded text in a dimension indicates information about a client at service initiation.  -Underlined portions in a dimension indicate important information that writer is carrying forward from week to week as a reminder.    CD ADULT Progress Note     Treatment Plan Review completed on:  3.22.2019    Attendance Dates: 3.18 (attended family group), 3.19, 3.21  Group Topics this week:  3.18- Check in & process (client was in family group with Blanco Cross)                                               3.19- Understanding the Recovery Process                                               3.21- Tx Assignment Presentations: What would my ideal life look like?                                                                                                           Opening the Book of Me                                                                                                              Identifying Relapse Triggers and Cues        Total # of Group Sessions for Phase I: 21  Total # of Group Sessions in Phase II:  15  Total # of Group Sessions in Phase III:  N/A  Total # of 1:1 Sessions: 6 MONDAY TUESDAY WEDNESDAY THURSDAY FRIDAY SATURDAY SUNDAY TOTAL HOURS   Group Therapy    x N/A x    6   Specialty Groups*            1:1           Family Program x          [place]             Phase III             Absent (place \"X\")             Total's        6     *Specialty Groups include Mental Health Care, Assertiveness and Communication, Sobriety Maintenance Skills, Spiritual Care, Stress Management, Relapse Prevention, Family Systems.                             Learning Style:    Hands on  verbal    Staff member contributing: Grecia Bashir MS, [facility]     Received supervision: No    Client contributed to goals and plan: Yes    Client received a signed copy of treatment plan/revised plan: Yes    Changes to Treatment Plan: added additional Relapse triggers and cue assignments    Client " agrees with plan/revised plan: Yes    Any changes in Vulnerable Adult Status:  No    1) Care Coordination Activities: None this week  2) Medical, Mental Health and other appointments the client attended: SEE DIM's II & III below.  3) Medication issues: No.  4) Physical and mental health problems: SEE DIM's II & III below.  5) Review and evaluation of the individual abuse prevention plan: No changes    Substance Use Disorders:    303.90 (F10.20) Alcohol Use Disorder Severe  305.20 (F12.10) Cannabis Use Disorder Mild  305.60 (F14.10) Cocaine Use Disorder Mild    ASAM Risk Ratings and Data     DIMENSION 1: Acute Intoxication/Withdrawal  The client's ability to cope with withdrawal symptoms and current state of intoxication     Acute Intoxication/Withdrawal - Current Risk Factor:  0    Reporting a sober date of: 12/5/2018 for Alcohol, cocaine use 1/12/2019.    Goals:  Develop effective strategies to maintain sobriety. Report any substance or alcohol use to both counselor and the group.     Data: Client denied nor demonstrated any signs/symptomology of intoxication and/or withdrawal. No reported use.    DIMENSION 2:  Biomedical Conditions and Complaints  The degree to which any physical disorder would interfere with treatment for substance abuse and the client's ability to tolerate any related discomfort     Biomedical Conditions and Complaints - Current Risk Factor:  1    Goals: Disclose CD status to medical providers and follow up with medical interventions while in DOP.     Goal: Client will effectively manage his physical health and will follow recommendations of your medical provider.    Data: Client sees Dr. Salomon Green and NP Tammy Cheatham, [facility]iculitits, high blood pressure, chronic neck and back pain.      Client rated his overall health on a scale of 1-10 (1=poor, 10=excellent): 8. Client reports that he had the flu and fever over the last few days. Client reports doing the following  "for self-care during the past week; \"resting.\"  Client reports the following changes to his physical health over the past week; \"none\".  Client denies having any upcoming doctor s appointments.  Client has access to his medical facility.    DIMENSION 3:  Emotional/Behavioral/Cognitive Conditions and Complications  The degree to which any condition or complications are likely to interfere with treatment for substance abuse or with function in significant life areas and the likelihood of risk of harm to self or others.     Emotional/Behavioral - Current Risk Factor:  1    DSM-5 Diagnoses:   F41.8 (300.09): Other Specified Anxiety Disorder; generalized anxiety disorder, not occurring more days than not.   Suicide Assessment:   Risk Status    Ideation - Active thoughts of suicide Intent to follow through on suicide Plan for completing suicide    Yes No Yes No Yes No   Emergent  x  x  x   Urgent / Non-Emergent  x  x  x   Non- Urgent  x  x  x   No Current/Active Risk  x  x  x      Goals: To develop affective tools to manage his emotions.  Goal:  Learn and utilize coping skills to manage his irrational believes and struggles with impulsivity in a healthy way.  Goals:  Increase awareness and understanding of the relationship between mental health and substance use.     Data: Client reports feeling the following two emotions today; \"content and strong.\" Client rated the following MH symptoms on a scale of 1-10 (0=did not endorse): sleeplessness- 2, fatigue- 2, difficulty concentrating- 1, mood swings- 1, irritability- 1, sadness- 0, excessive sleep- 1, racing thoughts- 0, hopelessness- 0. Client denies experiencing any other aforementioned symptomology over the past week. Client reports taking the following psychotropic medications; Lisinopril. Client reports next appointment with Alfreda JOY (psychotherapist) on: 3.26.2019. Client reports that he is working on \"anxiety\" in his individual MH sessions  Client denied suicidal " "ideation or self-injurious behavior.      DIMENSION 4:  Readiness to Change  Consider the amount of support and encouragement necessary to keep the client involved in treatment.     Readiness to Change - Current Risk Factor:  1    Goals:  Increase awareness with how substance use is conflicted with personal values and address any ambivalence to change.   Goals:  Address ambivalence regarding substance use and sobriety.  Goals:  Increase insight into how use has affected you and those around you  Goals:  Increase and/or maintain internal motivation to achieve sobriety from substances.    Data: Client rated their motivation of the week on a scale of 1-10 (1=low, 10= high): 8. Client reports his main motivation for sobriety is \"family and health\". Client reports \" free time\"  is what blocks motivation for sobriety for him. Client reports using the following coping strategies that are helpful for his continued sobriety: \"talking with his wife\". Client reports wanting to learn the following coping skills and/or to practice: \"reading more\".  Client is currently in the Action Stage of Change Model.    Intervention: Client processed what is important to him in his recovery process, such as his ability to be present for his son and gaining closeness with his wife. Client reported that he is recognizing how important his family is to him and this has been a big motivator for him to continue his sobriety.      Assessment: Client provided meaningful insight into discussion topic of \"Understanding the Recovery Process\" and appeared open and engaged. Client was receptive to accepting feedback from peers.     Plan: continue to present Tx assignments in group next week.            DIMENSION 5:  Relapse/Continued Use/Continued Problem Potential  Consider the degree to which the client recognizes relapse issues and has the skills to prevent relapse of either substance use or mental health problems.     Relapse/Continued Use/Continued " "Problem Potential - Current Risk Factor:  2    Goals:  Increase awareness of the disease concept of addiction and insight into personal relapse triggers and strategies to address.   Goals:  Identify and address relapse triggers and cues.    Data: Client rated his \"Average Craving Rating\" on a scale of 1-10 (1=low, 10= high): 4. Client denies any triggers to use this week. Client reports that being busy with his family minimized his cravings. Client reports \"nothing\" as a trigger this past week.     DIMENSION 6:  Recovery Environment  Consider the degree to which key areas of the client's life are supportive of or antagonistic to treatment participation and recovery.     Recovery Environment - Current Risk Factor: 2    Support group meetings attended this week: 1  Where: [facility]  If zero attendance, what s preventing you: N/A    Do you currently have a sponsor: No    Did you have contact with your sponsor this week? No    Did family agree to attend family week:  yes    If yes: 3.18.2019    Goals:  Increase sober support network. Continue to identify and attend sober support group meetings. Goal  Goal:  Implement and/or improve positive daily structure and routine.  Goal:  Build sober social support in community.  Goal:  Learn and practice communication and assertiveness skills.  Goal;  Address relationship conflicts with family.    Data: Client rated their \"Living Situation\" on a scale of 1-10 (1=very helpful, 10=very stressful): 1. Client reports that he is living with his wife and son and reports that his wife is very helpful.  My wife is supportive.  Client reported that he has a son who has mild signs of Autism. Client reports expanding his sober support network by: \"none this week\". Client reports sober activities this week being: \"playing games with my son and working on the house\".    Client denies having any legal issues.      Employment: Client reports he is employed full-time.  Volunteerism: " No.        Grecia Bashir MS, [facility]

## 2019-03-25 ENCOUNTER — HOSPITAL ENCOUNTER (OUTPATIENT)
Dept: BEHAVIORAL HEALTH | Facility: CLINIC | Age: 54
End: 2019-03-25
Attending: SOCIAL WORKER
Payer: COMMERCIAL

## 2019-03-25 PROCEDURE — H2035 A/D TX PROGRAM, PER HOUR: HCPCS | Mod: HQ

## 2019-03-26 ENCOUNTER — HOSPITAL ENCOUNTER (OUTPATIENT)
Dept: BEHAVIORAL HEALTH | Facility: CLINIC | Age: 54
End: 2019-03-26
Attending: SOCIAL WORKER
Payer: COMMERCIAL

## 2019-03-26 PROCEDURE — H2035 A/D TX PROGRAM, PER HOUR: HCPCS | Performed by: SOCIAL WORKER

## 2019-03-26 NOTE — PROGRESS NOTES
Progress Note for Individual Session on 3/26/19 from 3:05 PM - 4:10 PM   ROCKY Dong is  Doing well and feeling good  and said his weekend being on-call went well because he didn t have any jobs come up which is rare. One of his triggers is feeling resentment about this so we discussed how he was handling this emotion.  Meagan also spoke of his resentment generally going way down. He attended family night and he and his wife enjoyed this experience and they learned a lot.  He will be starting P3 this week. He continues to journal and do mindfulness. He stated,  I m really happy lately cause I m doing the right things.   He also said he was much calmer and doesn t react to things like he did when he was using.      I.  Individual session that included supportive and motivational therapy, review of coping strategies and impact on current symptoms, review of homework  Notice, Label, and Allow  and feeling uncomfortable emotions.  Meagan learned more ways to use cognitive de-fusion techniques and problem-solved with work issues.     TL Dong reported feeling happy, calmer and less resentful. He used the visualization of  Letting the thoughts roll down the river, I told the family group about this  and encouraged a co-worker to try cognitive de-fusion as well. He recognized when he was becoming  mad  about something and felt this emotion and let it go and  This issue hasn t come back up, it s like it went on down the stream too.   He said he would have ruminated on this issue before.  His stress is a 3 and he noted that he had drinking thoughts when he felt resentment with the on-call situation. He completed the PHQ-9 and scored 3 (minimal depression) and said this was mainly about the feelings of guilt that resurface at times. This was an improvement on his score of 4 on 12/11/19. He completed the SERJIO-7 and his score was a 1 (minimal anxiety) and this is an improvement on his score of 3 on 12/11/18. He notes no impairment  on these screenings.     JOHANN Dong will continue going to sober support and doing his journal and mindfulness. He will add a new visualization of watching a  screen  when uncomfortable emotions surface with memories of guilt. He will then center himself in the present using the technique learned today. Iker will also begin to feel his negative emotions with a sense of curiosity about how they affect him physically. His next session is scheduled for Wed, 4/10/19.

## 2019-03-27 ENCOUNTER — HOSPITAL ENCOUNTER (OUTPATIENT)
Dept: BEHAVIORAL HEALTH | Facility: CLINIC | Age: 54
End: 2019-03-27
Attending: SOCIAL WORKER
Payer: COMMERCIAL

## 2019-03-27 PROCEDURE — H2035 A/D TX PROGRAM, PER HOUR: HCPCS

## 2019-03-28 NOTE — PROGRESS NOTES
"  Key to reading dimensions:   -Bolded text in a dimension indicates information about a client at service initiation.  -Underlined portions in a dimension indicate important information that writer is carrying forward from week to week as a reminder.    CD ADULT Progress Note     Treatment Plan Review completed on:  3.28.2019    Attendance Dates: 3.27.2019          Total # of Group Sessions for Phase I: 21  Total # of Group Sessions in Phase II:  15  Total # of Group Sessions in Phase III:  1  Total # of 1:1 Sessions: 6 MONDAY TUESDAY WEDNESDAY THURSDAY FRIDAY SATURDAY SUNDAY TOTAL HOURS   Group Therapy     x     2   Specialty Groups*            1:1           Family Program           [facility]             Phase III             Absent (place \"X\")             Total's        2     *Specialty Groups include Mental Health Care, Assertiveness and Communication, Sobriety Maintenance Skills, Spiritual Care, Stress Management, Relapse Prevention, Family Systems.                             Learning Style:    Hands on  verbal    Staff member contributing: Grecia Bashir MS, [facility]     Received supervision: No    Client contributed to goals and plan: Yes    Client received a signed copy of treatment plan/revised plan: Yes    Changes to Treatment Plan: added additional Relapse triggers and cue assignments    Client agrees with plan/revised plan: Yes    Any changes in Vulnerable Adult Status:  No    1) Care Coordination Activities: None this week  2) Medical, Mental Health and other appointments the client attended: SEE DIM's II & III below.  3) Medication issues: No.  4) Physical and mental health problems: SEE DIM's II & III below.  5) Review and evaluation of the individual abuse prevention plan: No changes    Substance Use Disorders:    303.90 (F10.20) Alcohol Use Disorder Severe  305.20 (F12.10) Cannabis Use Disorder Mild  305.60 (F14.10) Cocaine Use Disorder Mild    ASAM Risk Ratings and Data     DIMENSION 1: Acute " "Intoxication/Withdrawal  The client's ability to cope with withdrawal symptoms and current state of intoxication     Acute Intoxication/Withdrawal - Current Risk Factor:  0    Reporting a sober date of: 12/5/2018 for Alcohol, cocaine use 1/12/2019.    Goals:  Develop effective strategies to maintain sobriety. Report any substance or alcohol use to both counselor and the group.     Data: Client denied nor demonstrated any signs/symptomology of intoxication and/or withdrawal. No reported use.    DIMENSION 2:  Biomedical Conditions and Complaints  The degree to which any physical disorder would interfere with treatment for substance abuse and the client's ability to tolerate any related discomfort     Biomedical Conditions and Complaints - Current Risk Factor:  1    Goals: Disclose CD status to medical providers and follow up with medical interventions while in DOP.     Goal: Client will effectively manage his physical health and will follow recommendations of your medical provider.    Data: Client sees Dr. Salomon Green and NP Tammy Cheatham, [facility] diverticulitits, high blood pressure, chronic neck and back pain.      Client rated his overall health on a scale of 1-10 (1=poor, 10=excellent): 8. Client reports that he had the flu and fever over the last few days. Client reports doing the following for self-care during the past week; \"resting.\"  Client reports the following changes to his physical health over the past week; \"none\".  Client denies having any upcoming doctor s appointments.  Client has access to his medical facility.    DIMENSION 3:  Emotional/Behavioral/Cognitive Conditions and Complications  The degree to which any condition or complications are likely to interfere with treatment for substance abuse or with function in significant life areas and the likelihood of risk of harm to self or others.     Emotional/Behavioral - Current Risk Factor:  1    DSM-5 Diagnoses:   F41.8 (300.09): Other " "Specified Anxiety Disorder; generalized anxiety disorder, not occurring more days than not.   Suicide Assessment:   Risk Status    Ideation - Active thoughts of suicide Intent to follow through on suicide Plan for completing suicide    Yes No Yes No Yes No   Emergent  x  x  x   Urgent / Non-Emergent  x  x  x   Non- Urgent  x  x  x   No Current/Active Risk  x  x  x      Goals: To develop affective tools to manage his emotions.  Goal:  Learn and utilize coping skills to manage his irrational believes and struggles with impulsivity in a healthy way.  Goals:  Increase awareness and understanding of the relationship between mental health and substance use.     Data: Client reports feeling the following two emotions today; \"content and strong.\" Client rated the following MH symptoms on a scale of 1-10 (0=did not endorse): sleeplessness- 2, fatigue- 2, difficulty concentrating- 1, mood swings- 1, irritability- 1, sadness- 0, excessive sleep- 1, racing thoughts- 0, hopelessness- 0. Client denies experiencing any other aforementioned symptomology over the past week. Client reports taking the following psychotropic medications; Lisinopril. Client reports next appointment with Alfreda JOY (psychotherapist) on: 4.12.2019 Client reports that he is working on \"anxiety\" in his individual MH sessions  Client denied suicidal ideation or self-injurious behavior.      DIMENSION 4:  Readiness to Change  Consider the amount of support and encouragement necessary to keep the client involved in treatment.     Readiness to Change - Current Risk Factor:  1    Goals:  Increase awareness with how substance use is conflicted with personal values and address any ambivalence to change.   Goals:  Address ambivalence regarding substance use and sobriety.  Goals:  Increase insight into how use has affected you and those around you  Goals:  Increase and/or maintain internal motivation to achieve sobriety from substances.    Data: Client rated their " "motivation of the week on a scale of 1-10 (1=low, 10= high): 8. Client reports his main motivation for sobriety is \"family and health\". Client reports \" free time\"  is what blocks motivation for sobriety for him. Client reports using the following coping strategies that are helpful for his continued sobriety: \"talking with his wife\". Client reports wanting to learn the following coping skills and/or to practice: \"reading more\".  Client is currently in the Action Stage of Change Model.    Intervention: Client processed what is important to him in his recovery process, such as his ability to be present for his son and gaining closeness with his wife. Client reported that he is recognizing how important his family is to him and this has been a big motivator for him to continue his sobriety.      Assessment: Client provided meaningful insight into discussion topic of \"Understanding the Recovery Process\" and appeared open and engaged. Client was receptive to accepting feedback from peers.     Plan: continue to work on \"personal recovery plan\".            DIMENSION 5:  Relapse/Continued Use/Continued Problem Potential  Consider the degree to which the client recognizes relapse issues and has the skills to prevent relapse of either substance use or mental health problems.     Relapse/Continued Use/Continued Problem Potential - Current Risk Factor:  2    Goals:  Increase awareness of the disease concept of addiction and insight into personal relapse triggers and strategies to address.   Goals:  Identify and address relapse triggers and cues.    Data: Client rated his \"Average Craving Rating\" on a scale of 1-10 (1=low, 10= high): 4. Client denies any triggers to use this week. Client reports that being busy with his family minimized his cravings. Client reports \"nothing\" as a trigger this past week.     DIMENSION 6:  Recovery Environment  Consider the degree to which key areas of the client's life are supportive of or " "antagonistic to treatment participation and recovery.     Recovery Environment - Current Risk Factor: 2    Support group meetings attended this week: 1  Where: [facility]  If zero attendance, what s preventing you: N/A    Do you currently have a sponsor: No    Did you have contact with your sponsor this week? No    Did family agree to attend family week:  yes    If yes: attended on 3.18 and 3.25    Goals:  Increase sober support network. Continue to identify and attend sober support group meetings. Goal  Goal:  Implement and/or improve positive daily structure and routine.  Goal:  Build sober social support in community.  Goal:  Learn and practice communication and assertiveness skills.  Goal;  Address relationship conflicts with family.    Data: Client rated their \"Living Situation\" on a scale of 1-10 (1=very helpful, 10=very stressful): 1. Client reports that he is living with his wife and son and reports that his wife is very helpful.  My wife is supportive.  Client reported that he has a son who has mild signs of Autism. Client reports expanding his sober support network by: \"none this week\". Client reports sober activities this week being: \"playing games with my son and working on the house\".    Client denies having any legal issues.      Employment: Client reports he is employed full-time.  Volunteerism: No.        Grecia Bashir MS, [facility]  "

## 2019-04-03 ENCOUNTER — HOSPITAL ENCOUNTER (OUTPATIENT)
Dept: BEHAVIORAL HEALTH | Facility: CLINIC | Age: 54
End: 2019-04-03
Attending: SOCIAL WORKER
Payer: COMMERCIAL

## 2019-04-03 PROCEDURE — H2035 A/D TX PROGRAM, PER HOUR: HCPCS | Mod: HQ

## 2019-04-04 NOTE — PROGRESS NOTES
"  Key to reading dimensions:   -Bolded text in a dimension indicates information about a client at service initiation.  -Underlined portions in a dimension indicate important information that writer is carrying forward from week to week as a reminder.    CD ADULT Progress Note     Treatment Plan Review completed on:  4.4.2019    Attendance Dates: 4.3.2019          Total # of Group Sessions for Phase I: 21  Total # of Group Sessions in Phase II:  15  Total # of Group Sessions in Phase III:  2  Total # of 1:1 Sessions: 6 MONDAY TUESDAY WEDNESDAY THURSDAY FRIDAY SATURDAY SUNDAY TOTAL HOURS   Group Therapy     x     2   Specialty Groups*            1:1           Family Program           [place]             Phase III             Absent (place \"X\")             Total's        2     *Specialty Groups include Mental Health Care, Assertiveness and Communication, Sobriety Maintenance Skills, Spiritual Care, Stress Management, Relapse Prevention, Family Systems.                             Learning Style:    Hands on  verbal    Staff member contributing: Grecia Bashir MS, [facility]     Received supervision: No    Client contributed to goals and plan: Yes    Client received a signed copy of treatment plan/revised plan: Yes    Changes to Treatment Plan: added additional Relapse triggers and cue assignments    Client agrees with plan/revised plan: Yes    Any changes in Vulnerable Adult Status:  No    1) Care Coordination Activities: None this week  2) Medical, Mental Health and other appointments the client attended: SEE DIM's II & III below.  3) Medication issues: No.  4) Physical and mental health problems: SEE DIM's II & III below.  5) Review and evaluation of the individual abuse prevention plan: No changes    Substance Use Disorders:    303.90 (F10.20) Alcohol Use Disorder Severe  305.20 (F12.10) Cannabis Use Disorder Mild  305.60 (F14.10) Cocaine Use Disorder Mild    ASAM Risk Ratings and Data     DIMENSION 1: Acute " "Intoxication/Withdrawal  The client's ability to cope with withdrawal symptoms and current state of intoxication     Acute Intoxication/Withdrawal - Current Risk Factor:  0    Reporting a sober date of: 12/5/2018 for Alcohol, cocaine use 1/12/2019.    Goals:  Develop effective strategies to maintain sobriety. Report any substance or alcohol use to both counselor and the group.     Data: Client denied nor demonstrated any signs/symptomology of intoxication and/or withdrawal. No reported use.    DIMENSION 2:  Biomedical Conditions and Complaints  The degree to which any physical disorder would interfere with treatment for substance abuse and the client's ability to tolerate any related discomfort     Biomedical Conditions and Complaints - Current Risk Factor:  1    Goals: Disclose CD status to medical providers and follow up with medical interventions while in DOP.     Goal: Client will effectively manage his physical health and will follow recommendations of your medical provider.    Data: Client sees Dr. Salomon Green and NP Tammy Cheatham, [facility] diverticulitits, high blood pressure, chronic neck and back pain.      Client rated his overall health on a scale of 1-10 (1=poor, 10=excellent): 8. Client reports that he had the flu and fever over the last few days. Client reports doing the following for self-care during the past week; \"resting.\"  Client reports the following changes to his physical health over the past week; \"none\".  Client denies having any upcoming doctor s appointments.  Client has access to his medical facility.    DIMENSION 3:  Emotional/Behavioral/Cognitive Conditions and Complications  The degree to which any condition or complications are likely to interfere with treatment for substance abuse or with function in significant life areas and the likelihood of risk of harm to self or others.     Emotional/Behavioral - Current Risk Factor:  1    DSM-5 Diagnoses:   F41.8 (300.09): Other " "Specified Anxiety Disorder; generalized anxiety disorder, not occurring more days than not.   Suicide Assessment:   Risk Status    Ideation - Active thoughts of suicide Intent to follow through on suicide Plan for completing suicide    Yes No Yes No Yes No   Emergent  x  x  x   Urgent / Non-Emergent  x  x  x   Non- Urgent  x  x  x   No Current/Active Risk  x  x  x      Goals: To develop affective tools to manage his emotions.  Goal:  Learn and utilize coping skills to manage his irrational believes and struggles with impulsivity in a healthy way.  Goals:  Increase awareness and understanding of the relationship between mental health and substance use.     Data: Client reports feeling the following two emotions today; \"happy and hopeful.\" Client rated the following MH symptoms on a scale of 1-10 (0=did not endorse): sleeplessness- 1, fatigue- 1 difficulty concentrating- 2, mood swings- 2, irritability- 3, sadness- 1, excessive sleep- 1, racing thoughts- 2, hopelessness- 0. Client denies experiencing any other aforementioned symptomology over the past week. Client reports taking the following psychotropic medications; Lisinopril. Client reports next appointment with Alfreda JOY (psychotherapist) on: 4.10.2019 Client reports that he is working on \"anxiety\" in his individual MH sessions  Client denied suicidal ideation or self-injurious behavior.      DIMENSION 4:  Readiness to Change  Consider the amount of support and encouragement necessary to keep the client involved in treatment.     Readiness to Change - Current Risk Factor:  1    Goals:  Increase awareness with how substance use is conflicted with personal values and address any ambivalence to change.   Goals:  Address ambivalence regarding substance use and sobriety.  Goals:  Increase insight into how use has affected you and those around you  Goals:  Increase and/or maintain internal motivation to achieve sobriety from substances.    Data: Client rated their " "motivation of the week on a scale of 1-10 (1=low, 10= high): 8. Client reports his main motivation for sobriety is \"work,family and health\". Client reports \" free time\"  is what blocks motivation for sobriety for him. Client reports using the following coping strategies that are helpful for his continued sobriety: \"labeling anxious thoughts\". Client reports wanting to learn the following coping skills and/or to practice: \"none identified this week\".  Client is currently in the Action Stage of Change Model.    Intervention: Client processed his thoughts on how addiction is most definitely a disease and can also be a learned behavior. Client reported that a person who struggles with drinking or doing drugs \"can't just stop\"/     Assessment: Client provided meaningful insight into discussion topic of \"addictions- a disease or a choice\" and appeared open and engaged. Client was receptive to accepting feedback from peers.     Plan: continue to work on \"personal recovery plan\".            DIMENSION 5:  Relapse/Continued Use/Continued Problem Potential  Consider the degree to which the client recognizes relapse issues and has the skills to prevent relapse of either substance use or mental health problems.     Relapse/Continued Use/Continued Problem Potential - Current Risk Factor:  2    Goals:  Increase awareness of the disease concept of addiction and insight into personal relapse triggers and strategies to address.   Goals:  Identify and address relapse triggers and cues.    Data: Client rated his \"Average Craving Rating\" on a scale of 1-10 (1=low, 10= high): 2. Client denies any triggers to use this week. Client reports that being busy with his family minimized his cravings. Client reports \"nothing\" as a trigger this past week.     DIMENSION 6:  Recovery Environment  Consider the degree to which key areas of the client's life are supportive of or antagonistic to treatment participation and recovery.     Recovery " "Environment - Current Risk Factor: 2    Support group meetings attended this week: 1  Where: [facility]  If zero attendance, what s preventing you: N/A    Do you currently have a sponsor: No    Did you have contact with your sponsor this week? No    Did family agree to attend family week:  yes    If yes: attended on 3.18 and 3.25    Goals:  Increase sober support network. Continue to identify and attend sober support group meetings. Goal  Goal:  Implement and/or improve positive daily structure and routine.  Goal:  Build sober social support in community.  Goal:  Learn and practice communication and assertiveness skills.  Goal;  Address relationship conflicts with family.    Data: Client rated their \"Living Situation\" on a scale of 1-10 (1=very helpful, 10=very stressful): 1 \"Helpful\". Client reports that he is living with his wife and son and reports that his wife is very helpful.  My wife is supportive.  Client reported that he has a son who has mild signs of Autism. Client reports expanding his sober support network by: \"talking openly about his recovery with co-workers\". Client reports sober activities this week being: \"shopping and dinner out with family\".    Client denies having any legal issues.      Employment: Client reports he is employed full-time.  Volunteerism: No.        Grecia Bashir MS, [facility]  "

## 2019-04-10 ENCOUNTER — HOSPITAL ENCOUNTER (OUTPATIENT)
Dept: BEHAVIORAL HEALTH | Facility: CLINIC | Age: 54
End: 2019-04-10
Attending: SOCIAL WORKER
Payer: COMMERCIAL

## 2019-04-10 PROCEDURE — H2035 A/D TX PROGRAM, PER HOUR: HCPCS | Performed by: SOCIAL WORKER

## 2019-04-10 PROCEDURE — H2035 A/D TX PROGRAM, PER HOUR: HCPCS

## 2019-04-10 NOTE — PROGRESS NOTES
"Iker Stephenson  April 10, 2019  [id number]    Individual Session Process Group Note from 3:35 PM - 4:35 PM (1 hour)           D:  Iker came for his session on time and was calm and had a bright affect. He reported that he has \"nothing to complain about\" and he was in a good mood.  He is no longer writing down in his journal but reviewing his day before his goes to sleep to each night. He reported that his ruminating thoughts that used to keep him up \"are all going away before I sleep, the racing thoughts are totally gone\" and he continues to sleep well and only gets up to use the bathroom.  He continues to do mindfulness before bed and does 5 minutes of deep breathing before bed as well.  He described using the skill he learned last session with \"being still with my emotions\" when he was very irritated about a transaction at his bank for a new car and the agent not calling him back as promised. He sat down and felt his anger and \"I let myself feel it, it just went away, I stopped thinking about it when I felt it in my body.\"  He reported his stress \"is way down\" and he has had no cravings. Iker also practiced the visualization of watching painful memories on a screen in his mind and then bringing himself back to the present moment and he said he liked this and it was helpful but he didn't remember to practice this enough. He said both his feelings of grief and guilt have decreased from an 8 to a 4 and his guilt, especially, I much lower since starting treatment. He continues to use the \"stream of thought visualization\" every evening to address his thoughts as well.     I:    Motivational and supportive therapy, problems solving review of stress reduction techniques and symptoms of anxiety and depression.  We did a mindfulness exercise together to remind of how to do the \"sounds\" meditation.       A:   Iker applies what he is learning and is making much progress per his self-reports.  Today he rated his " "anxiety at 5 and his symptoms the last week included \"being aggravated, quick to speak, being tense and frustrated.\" He was impressed with the results of feeling his negative emotions physically to de-fuse from them.  He said his depression was way down, between 2-3 and he added, \"I've never felt hopeless or helpless.\"  He reviewed the last 15 years with several painful surgeries on his thumb and his neck and then his mother dying, having a son born when he was 43 years old (who they planned for) and then having diverticulitis. He said it was 15 years of stages of recovering physically and he had to take opiates for all these operations which he felt made him more vulnerable for developing his addiction. I asked him if he wanted to continue with these sessions, to make sure he had more to work on, and he wanted to continue and reassess at the next session as usual. He said he wanted to practice the \"screen\" visualization, the sitting with negative emotions and the awareness of sounds the next 2 weeks and then report how they worked for him. He will write down his experiences with these techniques.     P:   Iker stated that he continues to feel better as he progresses and is now seeing how all this treatment fits together for a healthier and more productive lifestyle. He said he used to dread Mondays and now he looks forward to them and they go really quickly.  We will meet in two weeks for another session and Iker will do the following for the next time we meet:   Continue deep breathing and the river of thoughts that works so well for him, add practice of watching memories on video screen and then \"surfacing up to the present,\" be with negative emotions with curiosity - let them pass through on their own time, do awareness of sounds a couple times in the morning if possible.  Write down how these practices worked for him.       "

## 2019-04-10 NOTE — PROGRESS NOTES
"Individual Session    Writer met with client 1:1 due to inclement weather and the rest of the phase 3 not coming to group tonight.     Client reported his progress with his ability to recognize his anxiety increasing, acknowledging it, and moving on.   Client reported that because he feels \"less stressed by small things\" he does not have any thoughts of wanting to drink a beer to \"settle down\". Client reports that he is more present with his wife and son and they are very supportive of his recovery. Client reported that continues to stay connected to other group members and appreciates the accountability of his peers.     Client reported that he continues to engage in mental health 1:1 therapy with Alfreda Ballard. Client reported practicing more mindfulness, breathing, and releasing his negative thoughts to manage his anxiety and stress levels. Client reported that engaging in activities with his family minimize his cravings. Client reported that he attended his usual sober support meeting on Friday near his home. Client reported that he has become more connected to his spirituality and can see how this is important in early recovery.     Client will continue to work on his Personal Recovery Plan and present in group.     -Grecia Bashir, MS, [facility]  "

## 2019-05-01 ENCOUNTER — HOSPITAL ENCOUNTER (OUTPATIENT)
Dept: BEHAVIORAL HEALTH | Facility: CLINIC | Age: 54
End: 2019-05-01
Attending: SOCIAL WORKER
Payer: COMMERCIAL

## 2019-05-01 PROCEDURE — H2035 A/D TX PROGRAM, PER HOUR: HCPCS | Mod: HQ

## 2019-05-02 NOTE — PROGRESS NOTES
Iker Wattsg  [id number]    PHASE III               Adult CD Progress Note and Treatment Plan Review     Attendance     Wednesday    Group Date: 5.1.2019   Group Attendance Attended group session   Group Therapy Type Life skill(s)   Group Topic Covered Balanced Lifestyle   Client Participation/Contribution Discussed personal experience with topic. Expressed understanding of topic. Listened actively.   Client Participation Detail Highly involved   Attendance 2.0 Hours   Individual Attendance 2 Hours   Family Attendance None   Other Comments/Information None                                                    Group Date:                                          Total # of Phase 1 Group Sessions: 25     Total # of Phase 2 Group Sessions: 15  Total # of Phase 3 Group Sessions: 5  Total # of 1:1 Sessions: 6    Support group attended this week: yes    Reporting sobriety: Yes    Treatment Plan Review     Treatment Plan Review completed on:  5/2/2019     Projected discharge date: 5.15.2019    Client preferred learning style: Hands on  Verbal    Staff member(s) contributing: Grecia Bashir MS, RADAMESC     Received supervision: No.    Client involvement with treatment planning: contributed to goals and plan.    Client received copy of plan/revised plan: Yes    Client agrees with plan/revised plan: Yes    Changes to Treatment Plan: No    Client's Dimension 1 Goal(s) Maintain sobriety   Goal not addressed this week.   Client's Dimension 2 Goal(s) None Goal not addressed this week.   Client's Dimension 3 Goal(s) Develop coping skills to manage anxiety Goal not addressed this week.   Client's Dimension 4 Goal(s) Increase awareness of how use has affected other See below.   Client's Dimension 5 Goal(s) Identify triggers and urges to use Goal not addressed this week.   Client's Dimension 6 Goal(s) Attend sober support meetings attend AA meeting last week.     New Goals added since last review: None.    Goal(s) worked on since last  "review: Client continues to reflect on how his past drug use affected his family and how things are much more \"peaceful, not as stressful\" because he has been working really hard on his sobriety and life of recovery.     Strategies effective: Yes    Care Coordination Activities: None.    Medical, Mental Health and other appointments the client attended: None.    Medication issues: None.    Physical and mental health problems: None.    Review and evaluation of the individual abuse prevention plan: The programs individual abuse prevention plan (IAPP) is sufficient for this client.     Substance Use Disorders:    304.20 (F14.20) Cocaine Use Disorder Severe    ASAM Risk Rating: (Note the rationale for risk rating changes)    Dimension 1 0 No signs or symptoms of acute intoxication or withdrawal symptoms present or reported.    Dimension 2 0 No medical concerns or conditions.     Dimension 3 2 Continues to utilize healthy coping skills, like mindfulness, to decrease any symptoms of anxiety that may arise.    Dimension 4 1 Client is in the action stage of change.    Dimension 5 3 No reported use since January 2019.      Dimension 6 1 Continues to work full time, manage his household, attend AA weekly.    Data: (Need to include short narrative for the week on what was worked on)  offered feedback good insight client did participate  Client engaged in discussion of how he is able to better balance and manage any life stressors that arise, and stay focused on his recovery. Client reports that he is able to do this because he is motivated to continue to support his family.     Intervention:   Aftercare planning  Reviewed relapse prevention coping skills, such as seeking sober support from AA, family, and engaging in health hobbies.    Assessment:    Stages of Change Model  Action    Appears/Sounds:  Cooperative  Motivated  Engaged  Client will be completing phase 3 and EOP on May 15th, 2019.   Plan:   -Continue to attend 2-3 " sober support group meetings each week (this includes non-12-step/AA alternative meetings).  -Client will attend all weekly Phase 3 groups.   -Client to engage in sober activities each week.    Grecia Bashir MS, LADC

## 2019-05-08 ENCOUNTER — HOSPITAL ENCOUNTER (OUTPATIENT)
Dept: BEHAVIORAL HEALTH | Facility: CLINIC | Age: 54
End: 2019-05-08
Attending: SOCIAL WORKER
Payer: COMMERCIAL

## 2019-05-08 PROCEDURE — H2035 A/D TX PROGRAM, PER HOUR: HCPCS | Performed by: SOCIAL WORKER

## 2019-05-08 PROCEDURE — H2035 A/D TX PROGRAM, PER HOUR: HCPCS | Mod: HQ

## 2019-05-08 NOTE — PROGRESS NOTES
Iker Stephenson  May 8, 2019  [id number]    Progress Note of Individual Session   3:30 PM - 4:15 PM      D: Iker had a bright affect and appeared healthy and well-rested.  He is continuing to do his deep breathing, mindfulness 5 minutes each night and doing awareness of sounds meditation along with  centering  himself in the day. He uses cognitive defusion with negative thoughts but has had a harder time with negative emotions this week. He attributes this to having to be on-call this weekend which is a major stressor for him.  He plans on discharging from the program next week as 5/15/19 will be his last day of treatment as planned with his primary counselor, Grecia CASTAÑEDA.  He continues to review his day before getting ready for sleep and highlight things to be grateful for and  things that went right.   He reported no cravings. He feels he is on the right course and improving weekly.  He continues to go to sober support meetings.       I:  Motivational and supportive therapy, problem solving and review of stress reduction techniques and symptoms of anxiety and depression.  We processed through an emotional experience he had at work and how he was able to detach and look at the situation without blaming himself or the other person.  Explored his plans for after discharge next week and aftercare.       A:  Iker reports being  so much calmer than 6 months ago.   He continues to do the coping skills he is learning and reports decreases in stress, depression and anxiety. He completed a PHQ-9 today and had a score of 0 (0 = no symptoms identified for depression) and completed a SERJIO-7 today and had a score of 2 (0-4 is minimal symptoms of anxiety). His stress is low and he reported being much calmer on a daily basis. He is using stress reduction techniques, mindfulness and cognitive defusion daily.       P: This was Iker s last session with this therapist. Iker will complete this program next week on 5/15/19. He  has made significant progress in treatment as described above. He will continue to go to sober support, build his support network, and monitor his stress level and use his skills.  He will contact me in the future should he feel he needs a referral for more psychotherapy sessions.

## 2019-05-09 NOTE — PROGRESS NOTES
Iker Wattsg  [id number]               Adult CD Progress Note and Treatment Plan Review     Attendance  PHASE III     Wednesday    Group Date: 5.8.2019   Group Attendance Attended group session   Group Therapy Type Life skill(s)   Group Topic Covered Balanced Lifestyle   Client Participation/Contribution Discussed personal experience with topic. Listened actively. Offered helpful suggestions to peers.   Client Participation Detail Highly involved and Shows interest   Attendance 2.0 Hours   Individual Attendance None   Family Attendance None   Other Comments/Information None         Total # of Phase 1 Group Sessions: 24     Total # of Phase 2 Group Sessions: 15  Total # of Phase 3 Group Sessions: 5  Total # of 1:1 Sessions: 5    Support group attended this week: yes    Reporting sobriety: Yes    Treatment Plan Review     Treatment Plan Review completed on:  5/9/2019     Projected discharge date: 5.15.2019    Client preferred learning style: Visual  Hands on  Verbal    Staff member(s) contributing: Palak Bashir     Received supervision: No.    Client involvement with treatment planning: contributed to goals and plan.    Client received copy of plan/revised plan: Yes    Client agrees with plan/revised plan: Yes    Changes to Treatment Plan: No    Client's Dimension 1 Goal(s) Develop effective strategies to maintain sobriety.   See below.   Client's Dimension 2 Goal(s) Client will effectively manage his physical health and will follow recommendations of your medical provider. Goal not addressed this week.   Client's Dimension 3 Goal(s) Increase awareness and understanding of the relationship between mental health and substance use See below.   Client's Dimension 4 Goal(s) Increase insight into how use has affected you and those around you. See below.   Client's Dimension 5 Goal(s) Identify and address relapse triggers and cues. Goal not addressed this week.   Client's Dimension 6 Goal(s) Expand sober support  "network and be involved in sober activities. See below.     New Goals added since last review: None.    Goal(s) worked on since last review: Engaging in sober activities. Maintaining sobriety. Utilizing mindfulness skills to manage anxiety. Continued awareness of how being healthy and sober has positively impacted family relationships.    Strategies effective: Yes    Care Coordination Activities: None.    Medical, Mental Health and other appointments the client attended: None.    Medication issues: None.    Physical and mental health problems: None.    Review and evaluation of the individual abuse prevention plan: The programs individual abuse prevention plan (IAPP) is sufficient for this client.     Substance Use Disorders:    303.90 (F10.20) Alcohol Use Disorder Severe  304.20 (F14.20) Cocaine Use Disorder Moderate    ASAM Risk Rating: (Note the rationale for risk rating changes)    Dimension 1 0 No change    Dimension 2 0 No change    Dimension 3 0 no change    Dimension 4 0 no change    Dimension 5 1 no change      Dimension 6 0 no change    Data: (Need to include short narrative for the week on what was worked on)  offered feedback good insight client did actively participate  Client reported that he has been \"feeling solid\" and offers no \"complaints\". Client reported utilizing daily mindfulness when he feels symptoms of anxiety increasing, like racing thoughts, excessive worry. Client reported that he continues to attend his AA meeting in [place] and is open about his recovery with others.    Intervention:   Aftercare planning  Client will be presenting his Personal Recovery Plan next week when he completes EOP.     Assessment:    Stages of Change Model  Maintenance    Appears/Sounds:  Cooperative  Motivated  Engaged    Plan:   -Continue to attend 2-3 sober support group meetings each week (this includes non-12-step/AA alternative meetings).  -Client to engage in sober activities each week.    Grecia" MS Mckay, [facility]

## 2019-05-15 ENCOUNTER — HOSPITAL ENCOUNTER (OUTPATIENT)
Dept: BEHAVIORAL HEALTH | Facility: CLINIC | Age: 54
End: 2019-05-15
Attending: SOCIAL WORKER
Payer: COMMERCIAL

## 2019-05-15 PROCEDURE — H2035 A/D TX PROGRAM, PER HOUR: HCPCS | Mod: HQ

## 2019-05-15 NOTE — LETTER
[address] [address]        To whom this may concern,      Iker Stephenson, 1965, was admitted for evaluation/treatment of chemical dependency at [facility].  This person took part in these program(s):    ______ The Inpatient Program   ___X___ The Outpatient Program   ______ The Lodging Plus Program   ______ Lodging Day Outpatient       Date admitted: 12.18.2018  Date discharged: 5.15.2019    Type of discharge:   __X____ Satisfactory - completed evaluation / treatment   ______ Discharged without completing   ______ Behavioral discharge   ______ Transferred to another chemical dependency program   ______ Transferred to another type of service   ______ Left against medical advice (AMA) / Eloped       Comments:  Iker has successfully completed evening outpatient treatment and has been and is very committed to see his recovery, as evidenced by his commitment to the recovery community and remaining fully engaged and responsible during course of treatment programming.       Warm Regards,        Grecia Bashir MS, [facility]  Evening Outpatient Counselor  [facility]                         Date: 5/16/2019             Time: 12:15 PM

## 2019-05-16 NOTE — PROGRESS NOTES
-CHEMICAL DEPENDENCY DISCHAGE SUMMARY -    EVALUATION COUNSELOR:  Pilar Gaitan MA, [facility]     TREATMENT COUNSELOR:  Evening outpatient counselor: Grecia Bashir MS, [facility]    REFERRAL SOURCE:  Employer    PROGRAM: [facility] Evening Outpatient     ADMISSION DATE:  12.18.2019    DISCHARGE DATE:  5.15.2019    LAST SESSION DATE: 5.15.2019    ADMISSION DIAGNOSIS:     F10.20- Alcohol Use Disorder- Severe  F11.10- Opioid Use Disorder- Severe    DISCHARGE DIAGNOSIS:   F10.20- Alcohol Use Disorder- Severe  F11.10- Opioid Use Disorder- Severe      DISCHARGE STATUS: FAVORABLE    LAST USE DATE: Client reports date of last use being 1.12.2019 (cocaine).   SESSIONS OF TREATMENT COMPLETED:  45  PRESENTING INFORMATION:  Client admitted to [facility] Program on 12.18.2018. Client attended 24 Phase 1 sessions, 15 Phase II sessions, 6 Phase III sessions, and 5 individual sessions with Grecia Bashir MS, [facility]     SERVICES PROVIDED: Client participated in his treatment plan, attended group therapy sessions during which time he was exposed to a variety of therapeutic techniques including but not limited to the following:  Behavior modification, cognitive behavioral therapy, counselor feedback, education, emotional management, group feedback, motivational enhancement therapy, relapse prevention, 12-step facilitation, rational emotive behavioral therapy, and co-occurring group.          -ISSUES ADDRESSED IN TREATMENT-    DIMENSION 1 / ACUTE WITHDRAWAL ISSUES/DETOX:   Risk at Admission: 0  Risk at Discharge: 0    Client reported date of last use being 1.12.2019 (cocaine). At time of orientation, during course of treatment, and at time of discharge, no symptoms of acute intoxication or withdrawal symptoms were present or reported at time of orientation.       DIMENSION 2 / BIOMEDICAL:   Risk at Admission: 0  Risk at Discharge: 0    Upon intake, Client reported receiving medical  care from primary care physician Dr. Salomon Green. Client reported last appointment was in December 2018. Client reported he is prescribed and takes Bentyl, Zestril, and Zanaflex. During course of treatment and at time of discharge, client denies having any chronic medical conditions or medical concerns that would interfere with treatment programming.     DIMENSION 3 / EMOTIONAL / BEHAVIORAL:   Risk at Admission:  1   Risk at Discharge:  0    Upon intake, client denied having any mental health diagnosis. During course of treatment, client participated in mental health groups once a week for 6 weeks and continued to see mental health therapist, Alfreda Hickey for individual mental health sessions. Client reported learning to develop mindfulness and other coping skills to manage stress and anxiety. At time of discharge, client reports continuing to use all coping skills he has developed in mental health sessions and through evening outpatient treatment sessions to manage his anxiety and stress levels.    DIMENSION 4 / READINESS TO CHANGE:   Risk at Admission: 1  Risk at Discharge: 0  Upon intake, client reported this is his second CD treatment program and had attended outpatient CD treatment 20-25 years ago for issues with alcohol. Client admited to having a problem with alcohol since 2000 and most recently addiction issues with cocaine and opioid use. During course of treatment, client completed treatment assignments focusing on consequences of use on others and him, and sharing his chemical use history. At time of discharge, client continued to be cooperative, motivated, and committed to change, as evidenced by fully engaging with peers in EOP, and being engaged in his own personal recovery. Client appears to be in the maintenance stage of change.     DIMENSION 5 / RELAPSE & CONTINUED PROBLEM POTENTIAL:   Risk at Admission: 3  Risk at Discharge: 0    Upon intake client reported date of last use for cocaine being  1.12.2019. Client denied any admissions to detox, or having attended a sober support meeting. During course of treatment, client was able to identify his personal triggers to use and reports that if he continues to drink alcohol, he will continue to use other substances. Client reports attending an AA meeting 1x a week near his home in [place] on Friday evenings. At time of discharge, client appeared to recognize risk well and is able to manage any potential problems, as evidenced by his utilization of developed coping skills that help manage any anxiety that can cause him to desire to drink alcohol or use drugs.     DIMENSION 6 / RECOVERY ENVIRONMENT:   Risk at Admission:  2  Risk at Discharge:  0    Upon intake, client reported living with his wife and his 12 year old son in their own home in [place], [place]. Client denied having concerns regarding his immediate living environment or neighborhood. Client reported having a relationship conflict with use wife and his employed due to his alcohol use issues. Client reported that he is employed full time as an . Client denied having any legal history or any current legal issues. Client reported having a lack of a sober support network. During course of treatment, client attended a 12 step meeting 1x a week near his home in [place], [place]. Client connected with his peers in EOP to build upon his sober support network. Client also engaged in 2 family group sessions with his wife to help her understand addiction. At time of discharge, client continues to be employed full time as an , and reports that his wife is extremely supportive of his continued sobriety and commitment to his recovery.        STRENGTHS:  resourceful, insightful, responsible, and generous    PROGNOSIS:  FAVORABLE    LIVING ARRANGEMENTS AT DISCHARGE:  With his wife and son in their home in [place], [place]    -RECOMMENDATIONS-  -Abstain from all mood-altering substances  - Continue to  attend sober support meetings  - Continue to engage in sober activities supportive of recovery

## 2021-06-01 ENCOUNTER — RECORDS - HEALTHEAST (OUTPATIENT)
Dept: ADMINISTRATIVE | Facility: CLINIC | Age: 56
End: 2021-06-01